# Patient Record
Sex: FEMALE | HISPANIC OR LATINO | Employment: PART TIME | URBAN - METROPOLITAN AREA
[De-identification: names, ages, dates, MRNs, and addresses within clinical notes are randomized per-mention and may not be internally consistent; named-entity substitution may affect disease eponyms.]

---

## 2017-11-06 NOTE — H&P
DATE OF SCHEDULED SURGERY:  2017    REASON FOR SURGERY:  Menometrorrhagia, dysmenorrhea, pelvic pain, symptomatic   pelvic floor relaxation, associated type 2 stress urinary incontinence.    HISTORY OF PRESENT ILLNESS:  This is a 42-year-old woman  2, para 2,   negative urine pregnancy test on 2017, who states that she has exhausted   all conservative measures, now interested in proceeding forward with   attempted definitive surgical correction of her longstanding history of heavy   vaginal bleeding and passage of large clots.  The patient has had a blood   transfusion secondary to her heavy vaginal bleeding.  She also gets migraine   headaches with hormonal therapy.  She has exhausted all conservative measures,   not interested in proceeding forward with any further hormonal therapy, now   interested in proceeding forward with attempted definitive surgical correction   with laparoscopic-assisted vaginal hysterectomy, bilateral salpingectomy   after counseling and interested in proceeding forward with ovarian   conservation.  She is interested in proceeding forward with attempted   definitive surgical correction of her symptomatic pelvic floor relaxation with   a large bulge at the vaginal introitus consistent with the cervix and   anterior vaginal wall being the leading edges.  She also has significant   dyspareunia, which she attributes to her significant pelvic floor relaxation   with associated type 2 stress urinary incontinence, which was demonstrated and   confirmed on urodynamic studies.  She states that she has exhausted all   conservative measures, now interested in proceeding forward with correction of   her symptomatic pelvic floor relaxation with an anterior and posterior   vaginal repair, enterocele repair, sacrospinous wall suspension,   transobturator tape midurethral sling procedure in addition to a   perineoplasty.  She does understand the limitations of surgery in addressing    pelvic pain, dyspareunia, which maybe improved or actually worsened with the   surgery as described above and even understanding these limitations of surgery   and concerns regarding need for further management, she is interested in   proceeding forward with surgery nonetheless.  Of note, the patient has   undergone a workup including endometrial biopsy for her heavy vaginal   bleeding, which showed proliferative phase endometrium.  She also had an   abnormal Pap smear, which did demonstrate low-grade cervical dysplasia and   urodynamic studies, which demonstrate confirmed type 2 stress urinary   incontinence with an ultrasound that is currently pending.    PAST MEDICAL HISTORY:  Severe anemia requiring a blood transfusion, migraine   headache, otherwise as stated above.    PAST SURGICAL HISTORY:  She denies any previous surgeries.    OBSTETRICAL HISTORY:  The patient has had 2 previous deliveries.    GYNECOLOGIC HISTORY:  The patient does report completely irregular cycles,   severe dysmenorrhea now with breakthrough bleeding between cycles, significant   dyspareunia, large bulge at the vaginal introitus, stress urinary   incontinence, occasional overactive bladder symptoms, which may be as stated   above and improved as well or worsen requiring future management.  She denies   any previous sexually transmitted diseases or pelvic infections.    SOCIAL HISTORY:  She is .  She does have a history of depression and is   currently on antidepressant therapy.  She denies use of any alcohol, tobacco,   or recreational drug use.    MEDICATIONS:  Zoloft 100 mg tablet p.o. daily.    ALLERGIES:  No known drug allergies.    PHYSICAL EXAMINATION:  VITAL SIGNS:  She is afebrile, hemodynamically stable.  HEART:  Regular rate and rhythm.  CHEST:  Clear to auscultation bilaterally.  ABDOMEN:  Soft, nondistended, nontender.  Bowel sounds are present.  PELVIC:  Grade II anterior, posterior, and uterine relaxation with  minimal   tenderness to palpation at the uterine fundus.  No adnexal mass or tenderness   to palpation were appreciated on rectovaginal examination.  There is evidence   of an enterocele on rectovaginal examination.  EXTREMITIES:  Nontender.    LABORATORY, IMAGING, AND URODYNAMIC STUDIES:  As stated above.    ASSESSMENT AND PLAN:  A 42-year-old woman  2, para 2 who has exhausted   all conservative measures and is now interested in proceeding forward with   attempted definitive surgical correction.  Risks, benefits, and alternatives   of all individual portions of the procedure were addressed with the patient in   detail over several visits.  She has asked appropriate questions, signed the   appropriate consents, and is wishing to proceed forward with surgery as   planned.       ____________________________________     MD NOEMI SCOTT / DENISE    DD:  2017 08:03:52  DT:  2017 08:45:42    D#:  [id number]  Job#:  [id number]

## 2017-11-21 DIAGNOSIS — Z01.812 PRE-OPERATIVE LABORATORY EXAMINATION: ICD-10-CM

## 2017-11-21 DIAGNOSIS — Z01.810 PRE-OPERATIVE CARDIOVASCULAR EXAMINATION: ICD-10-CM

## 2017-11-21 LAB
ANION GAP SERPL CALC-SCNC: 11 MMOL/L (ref 0–11.9)
ANISOCYTOSIS BLD QL SMEAR: ABNORMAL
BASOPHILS # BLD AUTO: 0 % (ref 0–1.8)
BASOPHILS # BLD: 0 K/UL (ref 0–0.12)
BUN SERPL-MCNC: 14 MG/DL (ref 8–22)
CALCIUM SERPL-MCNC: 9.5 MG/DL (ref 8.5–10.5)
CHLORIDE SERPL-SCNC: 105 MMOL/L (ref 96–112)
CO2 SERPL-SCNC: 25 MMOL/L (ref 20–33)
CREAT SERPL-MCNC: 0.58 MG/DL (ref 0.5–1.4)
EOSINOPHIL # BLD AUTO: 0 K/UL (ref 0–0.51)
EOSINOPHIL NFR BLD: 0 % (ref 0–6.9)
ERYTHROCYTE [DISTWIDTH] IN BLOOD BY AUTOMATED COUNT: 43.6 FL (ref 35.9–50)
GFR SERPL CREATININE-BSD FRML MDRD: >60 ML/MIN/1.73 M 2
GLUCOSE SERPL-MCNC: 83 MG/DL (ref 65–99)
HCT VFR BLD AUTO: 24.6 % (ref 37–47)
HGB BLD-MCNC: 5.9 G/DL (ref 12–16)
HYPOCHROMIA BLD QL SMEAR: ABNORMAL
LG PLATELETS BLD QL SMEAR: NORMAL
LYMPHOCYTES # BLD AUTO: 1.37 K/UL (ref 1–4.8)
LYMPHOCYTES NFR BLD: 39.1 % (ref 22–41)
MANUAL DIFF BLD: NORMAL
MCH RBC QN AUTO: 14 PG (ref 27–33)
MCHC RBC AUTO-ENTMCNC: 24.1 G/DL (ref 33.6–35)
MCV RBC AUTO: 58.1 FL (ref 81.4–97.8)
MICROCYTES BLD QL SMEAR: ABNORMAL
MONOCYTES # BLD AUTO: 0.19 K/UL (ref 0–0.85)
MONOCYTES NFR BLD AUTO: 5.5 % (ref 0–13.4)
MORPHOLOGY BLD-IMP: NORMAL
NEUTROPHILS # BLD AUTO: 1.94 K/UL (ref 2–7.15)
NEUTROPHILS NFR BLD: 55.4 % (ref 44–72)
NRBC # BLD AUTO: 0 K/UL
NRBC BLD AUTO-RTO: 0 /100 WBC
OVALOCYTES BLD QL SMEAR: NORMAL
PLATELET # BLD AUTO: 244 K/UL (ref 164–446)
PLATELET BLD QL SMEAR: NORMAL
POIKILOCYTOSIS BLD QL SMEAR: NORMAL
POTASSIUM SERPL-SCNC: 3.9 MMOL/L (ref 3.6–5.5)
RBC # BLD AUTO: 4.22 M/UL (ref 4.2–5.4)
RBC BLD AUTO: PRESENT
SCHISTOCYTES BLD QL SMEAR: NORMAL
SODIUM SERPL-SCNC: 141 MMOL/L (ref 135–145)
WBC # BLD AUTO: 3.5 K/UL (ref 4.8–10.8)

## 2017-11-21 PROCEDURE — 85027 COMPLETE CBC AUTOMATED: CPT

## 2017-11-21 PROCEDURE — 36415 COLL VENOUS BLD VENIPUNCTURE: CPT

## 2017-11-21 PROCEDURE — 80048 BASIC METABOLIC PNL TOTAL CA: CPT

## 2017-11-21 PROCEDURE — 85007 BL SMEAR W/DIFF WBC COUNT: CPT

## 2017-11-21 RX ORDER — SERTRALINE HYDROCHLORIDE 100 MG/1
100 TABLET, FILM COATED ORAL DAILY
COMMUNITY
End: 2018-09-10

## 2017-11-21 RX ORDER — BUPROPION HYDROCHLORIDE 150 MG/1
150 TABLET, EXTENDED RELEASE ORAL DAILY
COMMUNITY
End: 2018-09-10

## 2017-11-21 NOTE — OR NURSING
Patient reports that she had a dental extraction this morning for an infected tooth.  She has not started her antibiotics at this time.   Reported to [place] at Dr Villarreal's office. She will check with her physician regarding the surgery.

## 2017-11-22 PROCEDURE — 99285 EMERGENCY DEPT VISIT HI MDM: CPT

## 2017-11-22 ASSESSMENT — PAIN SCALES - GENERAL: PAINLEVEL_OUTOF10: 0

## 2017-11-23 ENCOUNTER — HOSPITAL ENCOUNTER (EMERGENCY)
Facility: MEDICAL CENTER | Age: 42
End: 2017-11-23
Attending: EMERGENCY MEDICINE
Payer: MEDICAID

## 2017-11-23 VITALS
DIASTOLIC BLOOD PRESSURE: 66 MMHG | OXYGEN SATURATION: 99 % | BODY MASS INDEX: 25.56 KG/M2 | RESPIRATION RATE: 17 BRPM | HEIGHT: 64 IN | HEART RATE: 63 BPM | TEMPERATURE: 97.1 F | WEIGHT: 149.69 LBS | SYSTOLIC BLOOD PRESSURE: 117 MMHG

## 2017-11-23 DIAGNOSIS — D50.9 IRON DEFICIENCY ANEMIA, UNSPECIFIED IRON DEFICIENCY ANEMIA TYPE: ICD-10-CM

## 2017-11-23 DIAGNOSIS — D63.8 ANEMIA, CHRONIC DISEASE: ICD-10-CM

## 2017-11-23 LAB
ABO GROUP BLD: NORMAL
ANION GAP SERPL CALC-SCNC: 8 MMOL/L (ref 0–11.9)
ANISOCYTOSIS BLD QL SMEAR: ABNORMAL
BARCODED ABORH UBTYP: 5100
BARCODED PRD CODE UBPRD: NORMAL
BARCODED UNIT NUM UBUNT: NORMAL
BASOPHILS # BLD AUTO: 0.9 % (ref 0–1.8)
BASOPHILS # BLD: 0.04 K/UL (ref 0–0.12)
BLD GP AB SCN SERPL QL: NORMAL
BUN SERPL-MCNC: 15 MG/DL (ref 8–22)
CALCIUM SERPL-MCNC: 9.4 MG/DL (ref 8.5–10.5)
CHLORIDE SERPL-SCNC: 106 MMOL/L (ref 96–112)
CO2 SERPL-SCNC: 23 MMOL/L (ref 20–33)
COMPONENT R 8504R: NORMAL
CREAT SERPL-MCNC: 0.52 MG/DL (ref 0.5–1.4)
EOSINOPHIL # BLD AUTO: 0 K/UL (ref 0–0.51)
EOSINOPHIL NFR BLD: 0 % (ref 0–6.9)
ERYTHROCYTE [DISTWIDTH] IN BLOOD BY AUTOMATED COUNT: 43.5 FL (ref 35.9–50)
GFR SERPL CREATININE-BSD FRML MDRD: >60 ML/MIN/1.73 M 2
GIANT PLATELETS BLD QL SMEAR: NORMAL
GLUCOSE SERPL-MCNC: 83 MG/DL (ref 65–99)
HCT VFR BLD AUTO: 25.1 % (ref 37–47)
HGB BLD-MCNC: 6.1 G/DL (ref 12–16)
HYPOCHROMIA BLD QL SMEAR: ABNORMAL
INR PPP: 1.02 (ref 0.87–1.13)
LYMPHOCYTES # BLD AUTO: 1.97 K/UL (ref 1–4.8)
LYMPHOCYTES NFR BLD: 45.9 % (ref 22–41)
MACROCYTES BLD QL SMEAR: ABNORMAL
MANUAL DIFF BLD: NORMAL
MCH RBC QN AUTO: 14.2 PG (ref 27–33)
MCHC RBC AUTO-ENTMCNC: 24.3 G/DL (ref 33.6–35)
MCV RBC AUTO: 58.4 FL (ref 81.4–97.8)
MICROCYTES BLD QL SMEAR: ABNORMAL
MONOCYTES # BLD AUTO: 0.12 K/UL (ref 0–0.85)
MONOCYTES NFR BLD AUTO: 2.7 % (ref 0–13.4)
MORPHOLOGY BLD-IMP: NORMAL
NEUTROPHILS # BLD AUTO: 2.17 K/UL (ref 2–7.15)
NEUTROPHILS NFR BLD: 50.5 % (ref 44–72)
NRBC # BLD AUTO: 0 K/UL
NRBC BLD AUTO-RTO: 0 /100 WBC
OVALOCYTES BLD QL SMEAR: NORMAL
PLATELET # BLD AUTO: 226 K/UL (ref 164–446)
PLATELET BLD QL SMEAR: NORMAL
POIKILOCYTOSIS BLD QL SMEAR: NORMAL
POTASSIUM SERPL-SCNC: 3.9 MMOL/L (ref 3.6–5.5)
PRODUCT TYPE UPROD: NORMAL
PROTHROMBIN TIME: 13.1 SEC (ref 12–14.6)
RBC # BLD AUTO: 4.3 M/UL (ref 4.2–5.4)
RBC BLD AUTO: PRESENT
RH BLD: NORMAL
SODIUM SERPL-SCNC: 137 MMOL/L (ref 135–145)
UNIT STATUS USTAT: NORMAL
WBC # BLD AUTO: 4.3 K/UL (ref 4.8–10.8)

## 2017-11-23 PROCEDURE — 36430 TRANSFUSION BLD/BLD COMPNT: CPT

## 2017-11-23 PROCEDURE — 36415 COLL VENOUS BLD VENIPUNCTURE: CPT

## 2017-11-23 PROCEDURE — 86850 RBC ANTIBODY SCREEN: CPT

## 2017-11-23 PROCEDURE — 86923 COMPATIBILITY TEST ELECTRIC: CPT

## 2017-11-23 PROCEDURE — P9016 RBC LEUKOCYTES REDUCED: HCPCS

## 2017-11-23 PROCEDURE — 80048 BASIC METABOLIC PNL TOTAL CA: CPT

## 2017-11-23 PROCEDURE — 700105 HCHG RX REV CODE 258: Performed by: EMERGENCY MEDICINE

## 2017-11-23 PROCEDURE — 85007 BL SMEAR W/DIFF WBC COUNT: CPT

## 2017-11-23 PROCEDURE — 85027 COMPLETE CBC AUTOMATED: CPT

## 2017-11-23 PROCEDURE — 86901 BLOOD TYPING SEROLOGIC RH(D): CPT

## 2017-11-23 PROCEDURE — 86900 BLOOD TYPING SEROLOGIC ABO: CPT

## 2017-11-23 PROCEDURE — 85610 PROTHROMBIN TIME: CPT

## 2017-11-23 RX ORDER — SODIUM CHLORIDE 9 MG/ML
INJECTION, SOLUTION INTRAVENOUS CONTINUOUS
Status: DISCONTINUED | OUTPATIENT
Start: 2017-11-23 | End: 2017-11-23 | Stop reason: HOSPADM

## 2017-11-23 RX ADMIN — SODIUM CHLORIDE 500 ML: 9 INJECTION, SOLUTION INTRAVENOUS at 05:50

## 2017-11-23 ASSESSMENT — LIFESTYLE VARIABLES
EVER HAD A DRINK FIRST THING IN THE MORNING TO STEADY YOUR NERVES TO GET RID OF A HANGOVER: NO
TOTAL SCORE: 0
EVER FELT BAD OR GUILTY ABOUT YOUR DRINKING: NO
HAVE YOU EVER FELT YOU SHOULD CUT DOWN ON YOUR DRINKING: NO
CONSUMPTION TOTAL: INCOMPLETE
HAVE PEOPLE ANNOYED YOU BY CRITICIZING YOUR DRINKING: NO
TOTAL SCORE: 0
DO YOU DRINK ALCOHOL: YES
TOTAL SCORE: 0

## 2017-11-23 ASSESSMENT — ENCOUNTER SYMPTOMS
DIZZINESS: 1
FEVER: 0
NAUSEA: 0
BRUISES/BLEEDS EASILY: 1
SHORTNESS OF BREATH: 1
VOMITING: 0

## 2017-11-23 NOTE — ED NOTES
"Chief Complaint   Patient presents with   • Abnormal Labs     Pt states PCP called today and pt was told to come to ED for blood transfusion. Per pt, hemoglobin was 5.9 after blood draw yesterday. Pt has had prior transfusions, hx of anemia.      Pt ambulatory to triage with above complaint.     /59   Pulse 80   Temp 37.1 °C (98.8 °F)   Resp 16   Ht 1.626 m (5' 4\")   Wt 67.9 kg (149 lb 11.1 oz)   LMP  (LMP Unknown)   SpO2 100%   BMI 25.69 kg/m²     Pt informed of triage process and encouraged to notify staff of any changed or concerns. Pt placed back in lobby.   "

## 2017-11-23 NOTE — DISCHARGE INSTRUCTIONS
Iron Deficiency Anemia, Adult  Anemia is a condition in which there are less red blood cells or hemoglobin in the blood than normal. Hemoglobin is the part of red blood cells that carries oxygen. Iron deficiency anemia is anemia caused by too little iron. It is the most common type of anemia. It may leave you tired and short of breath.  CAUSES   · Lack of iron in the diet.  · Poor absorption of iron, as seen with intestinal disorders.  · Intestinal bleeding.  · Heavy periods.  SIGNS AND SYMPTOMS   Mild anemia may not be noticeable. Symptoms may include:  · Fatigue.  · Headache.  · Pale skin.  · Weakness.  · Tiredness.  · Shortness of breath.  · Dizziness.  · Cold hands and feet.  · Fast or irregular heartbeat.  DIAGNOSIS   Diagnosis requires a thorough evaluation and physical exam by your health care provider. Blood tests are generally used to confirm iron deficiency anemia. Additional tests may be done to find the underlying cause of your anemia. These may include:  · Testing for blood in the stool (fecal occult blood test).  · A procedure to see inside the colon and rectum (colonoscopy).  · A procedure to see inside the esophagus and stomach (endoscopy).  TREATMENT   Iron deficiency anemia is treated by correcting the cause of the deficiency. Treatment may involve:  · Adding iron-rich foods to your diet.  · Taking iron supplements. Pregnant or breastfeeding women need to take extra iron because their normal diet usually does not provide the required amount.  · Taking vitamins. Vitamin C improves the absorption of iron. Your health care provider may recommend that you take your iron tablets with a glass of orange juice or vitamin C supplement.  · Medicines to make heavy menstrual flow lighter.  · Surgery.  HOME CARE INSTRUCTIONS   · Take iron as directed by your health care provider.  ¨ If you cannot tolerate taking iron supplements by mouth, talk to your health care provider about taking them through a vein  (intravenously) or an injection into a muscle.  ¨ For the best iron absorption, iron supplements should be taken on an empty stomach. If you cannot tolerate them on an empty stomach, you may need to take them with food.  ¨ Do not drink milk or take antacids at the same time as your iron supplements. Milk and antacids may interfere with the absorption of iron.  ¨ Iron supplements can cause constipation. Make sure to include fiber in your diet to prevent constipation. A stool softener may also be recommended.  · Take vitamins as directed by your health care provider.  · Eat a diet rich in iron. Foods high in iron include liver, lean beef, whole-grain bread, eggs, dried fruit, and dark green leafy vegetables.  SEEK IMMEDIATE MEDICAL CARE IF:   · You faint. If this happens, do not drive. Call your local emergency services (911 in [place] if no other help is available.  · You have chest pain.  · You feel nauseous or vomit.  · You have severe or increased shortness of breath with activity.  · You feel weak.  · You have a rapid heartbeat.  · You have unexplained sweating.  · You become light-headed when getting up from a chair or bed.  MAKE SURE YOU:   · Understand these instructions.  · Will watch your condition.  · Will get help right away if you are not doing well or get worse.     This information is not intended to replace advice given to you by your health care provider. Make sure you discuss any questions you have with your health care provider.     Document Released: 12/15/2001 Document Revised: 01/08/2016 Document Reviewed: 08/25/2014  [employer] Patient Education ©2016 [employer]

## 2017-11-23 NOTE — ED NOTES
Transfusion finished. Pt given discharge instructions. Pt verbalized understanding. VSS no acute distress noted, pt able to ambulate without difficulty. Pt home with daughter

## 2017-11-23 NOTE — ED NOTES
Pt brought back to rm GR 27 from triage. Pt c/o dizziness, SOB, and feeling tired. Pt able to transfer self to bed, pt in gown, on monitor, family at bedside, call light in reach. PIV line established. Blood drawn and sent to the lab. Chart up for ERP.

## 2017-11-23 NOTE — ED PROVIDER NOTES
ED Provider Note    ED Provider Note    Scribed for Anila Nolan D.O. by Anila Nolan. 11/23/2017, 2:53 AM.    Primary care provider: Winston Castle D.N.P.  Means of arrival: POV  History obtained from: Patient  History limited by: None    CHIEF COMPLAINT  Chief Complaint   Patient presents with   • Abnormal Labs     Pt states PCP called today and pt was told to come to ED for blood transfusion. Per pt, hemoglobin was 5.9 after blood draw yesterday. Pt has had prior transfusions, hx of anemia.        ATIYA Catalan is a 42 y.o. female who presents to the Emergency DepartmentOn recommendation from her primary care doctor for a blood transfusion. Patient had labs drawn on November 21. She she was found to have a hemoglobin of 5.9. Patient has a long history of anemia. She was last transfused in 2014. She is supposed to be on iron although she does not take it as instructed. Her anemia is thought to be due to an absorption issue. Patient had gastric bypass. She does complain of occasional lightheadedness, shortness of breath and chest tightness when she feels very hemoglobin lowering. Patient is feeling fine at this time but she has had these symptoms recently. She's recently been on amoxicillin for a dental procedure. She denies any fever, GI symptoms or urinary symptoms.    REVIEW OF SYSTEMS  Review of Systems   Constitutional: Negative for fever.   Respiratory: Positive for shortness of breath.    Cardiovascular: Negative for chest pain.   Gastrointestinal: Negative for nausea and vomiting.   Genitourinary: Negative for dysuria.   Neurological: Positive for dizziness.   Endo/Heme/Allergies: Bruises/bleeds easily.   All other systems reviewed and are negative.      PAST MEDICAL HISTORY   has a past medical history of Anemia (11/21/2017) and Dental disorder (11/20/2017).    SURGICAL HISTORY   has a past surgical history that includes gastric bypass laparoscopic.    SOCIAL HISTORY  Social History   Substance  "Use Topics   • Smoking status: Never Smoker   • Smokeless tobacco: Never Used   • Alcohol use No      History   Drug Use No       FAMILY HISTORY  History reviewed. No pertinent family history.    CURRENT MEDICATIONS  Home Medications    **Home medications have not yet been reviewed for this encounter**         ALLERGIES  No Known Allergies    PHYSICAL EXAM  VITAL SIGNS: /59   Pulse 63   Temp 37.1 °C (98.8 °F)   Resp 18   Ht 1.626 m (5' 4\")   Wt 67.9 kg (149 lb 11.1 oz)   LMP  (LMP Unknown)   SpO2 100%   BMI 25.69 kg/m²   Vitals reviewed.  Constitutional: Patient is oriented to person, place, and time. Appears well-developed and well-nourished. No distress.    Head: Normocephalic and atraumatic.   Ears: Normal external ears bilaterally.   Mouth/Throat: Oropharynx is clear and moist  Eyes: Conjunctivae are normal. Pupils are equal, round, and reactive to light.   Neck: Normal range of motion. Neck supple.  Cardiovascular: Normal rate, regular rhythm and normal heart sounds. Normal peripheral pulses.  Pulmonary/Chest: Effort normal and breath sounds normal. No respiratory distress, no wheezes, rhonchi, or rales.   Abdominal: Soft. Bowel sounds are normal. There is no tenderness, rebound or guarding, or peritoneal signs  Musculoskeletal: No edema  Neurological: No focal deficits.   Skin: Skin is warm and dry. No erythema. No pallor. bruising noted to the left forearm  Psychiatric: Patient has a normal mood and affect.     LABS  Results for orders placed or performed during the hospital encounter of 11/23/17   CBC WITH DIFFERENTIAL   Result Value Ref Range    WBC 4.3 (L) 4.8 - 10.8 K/uL    RBC 4.30 4.20 - 5.40 M/uL    Hemoglobin 6.1 (L) 12.0 - 16.0 g/dL    Hematocrit 25.1 (L) 37.0 - 47.0 %    MCV 58.4 (L) 81.4 - 97.8 fL    MCH 14.2 (L) 27.0 - 33.0 pg    MCHC 24.3 (L) 33.6 - 35.0 g/dL    RDW 43.5 35.9 - 50.0 fL    Nucleated RBC 0.00 /100 WBC    NRBC (Absolute) 0.00 K/uL    Neutrophils-Polys 50.50 44.00 - " 72.00 %    Lymphocytes 45.90 (H) 22.00 - 41.00 %    Monocytes 2.70 0.00 - 13.40 %    Eosinophils 0.00 0.00 - 6.90 %    Basophils 0.90 0.00 - 1.80 %    Neutrophils (Absolute) 2.17 2.00 - 7.15 K/uL    Lymphs (Absolute) 1.97 1.00 - 4.80 K/uL    Monos (Absolute) 0.12 0.00 - 0.85 K/uL    Eos (Absolute) 0.00 0.00 - 0.51 K/uL    Baso (Absolute) 0.04 0.00 - 0.12 K/uL    Hypochromia 1+     Anisocytosis 2+     Macrocytosis 1+     Microcytosis 2+     Platelet Count 226 164 - 446 K/uL   BASIC METABOLIC PANEL   Result Value Ref Range    Sodium 137 135 - 145 mmol/L    Potassium 3.9 3.6 - 5.5 mmol/L    Chloride 106 96 - 112 mmol/L    Co2 23 20 - 33 mmol/L    Glucose 83 65 - 99 mg/dL    Bun 15 8 - 22 mg/dL    Creatinine 0.52 0.50 - 1.40 mg/dL    Calcium 9.4 8.5 - 10.5 mg/dL    Anion Gap 8.0 0.0 - 11.9   PROTHROMBIN TIME   Result Value Ref Range    PT 13.1 12.0 - 14.6 sec    INR 1.02 0.87 - 1.13   COD (ADULT)   Result Value Ref Range    ABO Grouping Only O     Rh Grouping Only POS     Antibody Screen-Cod NEG     Component R       R3                  Red Blood Cells3    [id number]   issued       11/23/17   05:28      Product Type Red Blood Cells LR Pheresis     Dispense Status Issued     Unit Number (Barcoded) [id number]     Product Code (Barcoded) T6528Y98     Blood Type (Barcoded) 5100    ESTIMATED GFR   Result Value Ref Range    GFR If African American >60 >60 mL/min/1.73 m 2    GFR If Non African American >60 >60 mL/min/1.73 m 2   DIFFERENTIAL MANUAL   Result Value Ref Range    Manual Diff Status PERFORMED    PERIPHERAL SMEAR REVIEW   Result Value Ref Range    Peripheral Smear Review see below    PLATELET ESTIMATE   Result Value Ref Range    Plt Estimation Normal    MORPHOLOGY   Result Value Ref Range    RBC Morphology Present     Giant Platelets 2+     Poikilocytosis 2+     Ovalocytes 2+        All labs reviewed by me.    COURSE & MEDICAL DECISION MAKING  Pertinent Labs & Imaging studies reviewed. (See chart for  details)    Obtained and reviewed past medical records. Patient's last encounter was from an admission in 2014. She was diagnosed with iron deficiency microcytic hypochromic anemia and possible pernicious anemia. She was transfused blood at this time.    2:53 AM - Patient seen and examined at bedside. Patient's well-appearing and nontoxic. She has normal vital signs. She is not in any distress. We discussed lab results and the fact that we'll repeat PTs before transfusing. This appears to be an ongoing, chronic problem. Partly exacerbated by the fact that the patient does not routinely take iron as directed.     5:48 AM, patient's reevaluated. No new complaints. At this time, we discussed lab results. Hemoglobin 6.1. I've ordered 1 unit of blood. Patient essentially asymptomatic at this time with normal vital signs. This is a chronic problem. I feel she is safe for transfusion and then discharged to follow up with her primary care doctor and this is her preference as well. She is well-appearing and nontoxic. I anticipate discharge to home after she is transfused.    Patient's in stable condition.      FINAL IMPRESSION  1. Anemia, chronic disease    2. Iron deficiency anemia, unspecified iron deficiency anemia type

## 2017-11-28 ENCOUNTER — HOSPITAL ENCOUNTER (OUTPATIENT)
Facility: MEDICAL CENTER | Age: 42
End: 2017-11-28
Attending: SPECIALIST | Admitting: SPECIALIST
Payer: MEDICAID

## 2017-11-28 VITALS
HEART RATE: 68 BPM | HEIGHT: 64 IN | OXYGEN SATURATION: 100 % | TEMPERATURE: 98.4 F | DIASTOLIC BLOOD PRESSURE: 71 MMHG | RESPIRATION RATE: 16 BRPM | BODY MASS INDEX: 25.37 KG/M2 | WEIGHT: 148.59 LBS | SYSTOLIC BLOOD PRESSURE: 136 MMHG

## 2017-11-28 LAB — HCG SERPL QL: NEGATIVE

## 2017-11-28 PROCEDURE — 160035 HCHG PACU - 1ST 60 MINS PHASE I: Performed by: SPECIALIST

## 2017-11-28 PROCEDURE — 501838 HCHG SUTURE GENERAL: Performed by: SPECIALIST

## 2017-11-28 PROCEDURE — 501330 HCHG SET, CYSTO IRRIG TUBING: Performed by: SPECIALIST

## 2017-11-28 PROCEDURE — 84703 CHORIONIC GONADOTROPIN ASSAY: CPT

## 2017-11-28 PROCEDURE — 700111 HCHG RX REV CODE 636 W/ 250 OVERRIDE (IP)

## 2017-11-28 PROCEDURE — 501579 HCHG TROCAR, STEP 5MM: Performed by: SPECIALIST

## 2017-11-28 PROCEDURE — 500854 HCHG NEEDLE, INSUFFLATION FOR STEP: Performed by: SPECIALIST

## 2017-11-28 PROCEDURE — A4338 INDWELLING CATHETER LATEX: HCPCS | Performed by: SPECIALIST

## 2017-11-28 PROCEDURE — 700102 HCHG RX REV CODE 250 W/ 637 OVERRIDE(OP)

## 2017-11-28 PROCEDURE — 160029 HCHG SURGERY MINUTES - 1ST 30 MINS LEVEL 4: Performed by: SPECIALIST

## 2017-11-28 PROCEDURE — 160048 HCHG OR STATISTICAL LEVEL 1-5: Performed by: SPECIALIST

## 2017-11-28 PROCEDURE — 88307 TISSUE EXAM BY PATHOLOGIST: CPT

## 2017-11-28 PROCEDURE — 160009 HCHG ANES TIME/MIN: Performed by: SPECIALIST

## 2017-11-28 PROCEDURE — 500892 HCHG PACK, PERI-GYN: Performed by: SPECIALIST

## 2017-11-28 PROCEDURE — C1771 REP DEV, URINARY, W/SLING: HCPCS | Performed by: SPECIALIST

## 2017-11-28 PROCEDURE — 501577 HCHG TROCAR, STEP 11MM: Performed by: SPECIALIST

## 2017-11-28 PROCEDURE — 502240 HCHG MISC OR SUPPLY RC 0272: Performed by: SPECIALIST

## 2017-11-28 PROCEDURE — 700101 HCHG RX REV CODE 250

## 2017-11-28 PROCEDURE — 160036 HCHG PACU - EA ADDL 30 MINS PHASE I: Performed by: SPECIALIST

## 2017-11-28 PROCEDURE — 160002 HCHG RECOVERY MINUTES (STAT): Performed by: SPECIALIST

## 2017-11-28 PROCEDURE — 500886 HCHG PACK, LAPAROSCOPY: Performed by: SPECIALIST

## 2017-11-28 PROCEDURE — 502703 HCHG DEVICE, LIGASURE V SEALER: Performed by: SPECIALIST

## 2017-11-28 PROCEDURE — 160041 HCHG SURGERY MINUTES - EA ADDL 1 MIN LEVEL 4: Performed by: SPECIALIST

## 2017-11-28 PROCEDURE — A9270 NON-COVERED ITEM OR SERVICE: HCPCS

## 2017-11-28 DEVICE — SLING TOT OBTRYX I HALO: Type: IMPLANTABLE DEVICE | Status: FUNCTIONAL

## 2017-11-28 RX ORDER — SODIUM CHLORIDE, SODIUM LACTATE, POTASSIUM CHLORIDE, CALCIUM CHLORIDE 600; 310; 30; 20 MG/100ML; MG/100ML; MG/100ML; MG/100ML
INJECTION, SOLUTION INTRAVENOUS CONTINUOUS
Status: DISCONTINUED | OUTPATIENT
Start: 2017-11-28 | End: 2017-11-28 | Stop reason: HOSPADM

## 2017-11-28 RX ORDER — ACETAMINOPHEN 500 MG
1000 TABLET ORAL EVERY 6 HOURS
Status: DISCONTINUED | OUTPATIENT
Start: 2017-11-28 | End: 2017-11-28 | Stop reason: HOSPADM

## 2017-11-28 RX ORDER — METOCLOPRAMIDE HYDROCHLORIDE 5 MG/ML
10 INJECTION INTRAMUSCULAR; INTRAVENOUS EVERY 4 HOURS PRN
Status: DISCONTINUED | OUTPATIENT
Start: 2017-11-28 | End: 2017-11-28 | Stop reason: HOSPADM

## 2017-11-28 RX ORDER — EPINEPHRINE 1 MG/ML
INJECTION INTRAMUSCULAR; INTRAVENOUS; SUBCUTANEOUS
Status: DISCONTINUED
Start: 2017-11-28 | End: 2017-11-28 | Stop reason: HOSPADM

## 2017-11-28 RX ORDER — BUPIVACAINE HYDROCHLORIDE AND EPINEPHRINE 2.5; 5 MG/ML; UG/ML
INJECTION, SOLUTION EPIDURAL; INFILTRATION; INTRACAUDAL; PERINEURAL
Status: DISCONTINUED | OUTPATIENT
Start: 2017-11-28 | End: 2017-11-28 | Stop reason: HOSPADM

## 2017-11-28 RX ORDER — OXYCODONE HCL 5 MG/5 ML
SOLUTION, ORAL ORAL
Status: COMPLETED
Start: 2017-11-28 | End: 2017-11-28

## 2017-11-28 RX ORDER — OXYCODONE HYDROCHLORIDE 5 MG/1
10 TABLET ORAL
Status: DISCONTINUED | OUTPATIENT
Start: 2017-11-28 | End: 2017-11-28 | Stop reason: HOSPADM

## 2017-11-28 RX ORDER — BUPIVACAINE HYDROCHLORIDE AND EPINEPHRINE 2.5; 5 MG/ML; UG/ML
INJECTION, SOLUTION INFILTRATION; PERINEURAL
Status: DISCONTINUED | OUTPATIENT
Start: 2017-11-28 | End: 2017-11-28 | Stop reason: HOSPADM

## 2017-11-28 RX ORDER — ONDANSETRON 2 MG/ML
4 INJECTION INTRAMUSCULAR; INTRAVENOUS EVERY 6 HOURS PRN
Status: DISCONTINUED | OUTPATIENT
Start: 2017-11-28 | End: 2017-11-28 | Stop reason: HOSPADM

## 2017-11-28 RX ORDER — BUPIVACAINE HYDROCHLORIDE 2.5 MG/ML
INJECTION, SOLUTION EPIDURAL; INFILTRATION; INTRACAUDAL
Status: DISCONTINUED
Start: 2017-11-28 | End: 2017-11-28 | Stop reason: HOSPADM

## 2017-11-28 RX ORDER — SIMETHICONE 80 MG
80 TABLET,CHEWABLE ORAL EVERY 8 HOURS PRN
Status: DISCONTINUED | OUTPATIENT
Start: 2017-11-28 | End: 2017-11-28 | Stop reason: HOSPADM

## 2017-11-28 RX ORDER — MEPERIDINE HYDROCHLORIDE 25 MG/ML
INJECTION INTRAMUSCULAR; INTRAVENOUS; SUBCUTANEOUS
Status: COMPLETED
Start: 2017-11-28 | End: 2017-11-28

## 2017-11-28 RX ADMIN — OXYCODONE HYDROCHLORIDE 10 MG: 5 SOLUTION ORAL at 10:05

## 2017-11-28 RX ADMIN — SODIUM CHLORIDE, SODIUM LACTATE, POTASSIUM CHLORIDE, CALCIUM CHLORIDE 1000 ML: 600; 310; 30; 20 INJECTION, SOLUTION INTRAVENOUS at 06:10

## 2017-11-28 RX ADMIN — MEPERIDINE HYDROCHLORIDE 12.5 MG: 25 INJECTION INTRAMUSCULAR; INTRAVENOUS; SUBCUTANEOUS at 09:34

## 2017-11-28 RX ADMIN — FENTANYL CITRATE 50 MCG: 50 INJECTION, SOLUTION INTRAMUSCULAR; INTRAVENOUS at 10:05

## 2017-11-28 RX ADMIN — FENTANYL CITRATE 25 MCG: 50 INJECTION, SOLUTION INTRAMUSCULAR; INTRAVENOUS at 12:15

## 2017-11-28 RX ADMIN — MEPERIDINE HYDROCHLORIDE 12.5 MG: 25 INJECTION INTRAMUSCULAR; INTRAVENOUS; SUBCUTANEOUS at 09:28

## 2017-11-28 RX ADMIN — FENTANYL CITRATE 25 MCG: 50 INJECTION, SOLUTION INTRAMUSCULAR; INTRAVENOUS at 12:20

## 2017-11-28 ASSESSMENT — PAIN SCALES - GENERAL
PAINLEVEL_OUTOF10: 4
PAINLEVEL_OUTOF10: 0
PAINLEVEL_OUTOF10: 5
PAINLEVEL_OUTOF10: 0
PAINLEVEL_OUTOF10: 0
PAINLEVEL_OUTOF10: 3
PAINLEVEL_OUTOF10: 0

## 2017-11-28 NOTE — DISCHARGE INSTRUCTIONS
ACTIVITY: Rest and take it easy for the first 24 hours.  A responsible adult is recommended to remain with you during that time.  It is normal to feel sleepy.  We encourage you to not do anything that requires balance, judgment or coordination.    MILD FLU-LIKE SYMPTOMS ARE NORMAL. YOU MAY EXPERIENCE GENERALIZED MUSCLE ACHES, THROAT IRRITATION, HEADACHE AND/OR SOME NAUSEA.    FOR 24 HOURS DO NOT:  Drive, operate machinery or run household appliances.  Drink beer or alcoholic beverages.   Make important decisions or sign legal documents.    SPECIAL INSTRUCTIONS: *Refer to hysterectomy instructions**    DIET: To avoid nausea, slowly advance diet as tolerated, avoiding spicy or greasy foods for the first day.  Add more substantial food to your diet according to your physician's instructions.  Babies can be fed formula or breast milk as soon as they are hungry.  INCREASE FLUIDS AND FIBER TO AVOID CONSTIPATION.    SURGICAL DRESSING/BATHING: *May remove dressings then may shower in 24 hours**    FOLLOW-UP APPOINTMENT:  A follow-up appointment should be arranged with your doctor in *call to schedule**.    You should CALL YOUR PHYSICIAN if you develop:  Fever greater than 101 degrees F.  Pain not relieved by medication, or persistent nausea or vomiting.  Excessive bleeding (blood soaking through dressing) or unexpected drainage from the wound.  Extreme redness or swelling around the incision site, drainage of pus or foul smelling drainage.  Inability to urinate or empty your bladder within 8 hours.  Problems with breathing or chest pain.    You should call 911 if you develop problems with breathing or chest pain.  If you are unable to contact your doctor or surgical center, you should go to the nearest emergency room or urgent care center.  Physician's telephone #: *Dr. Villarreal [phone]**    If any questions arise, call your doctor.  If your doctor is not available, please feel free to call the Surgical Center at  [phone].  The Center is open Monday through Friday from 7AM to 7PM.  You can also call the HEALTH HOTLINE open 24 hours/day, 7 days/week and speak to a nurse at [phone], or toll free at [phone].    A registered nurse may call you a few days after your surgery to see how you are doing after your procedure.    MEDICATIONS: Resume taking daily medication.  Take prescribed pain medication with food.  If no medication is prescribed, you may take non-aspirin pain medication if needed.  PAIN MEDICATION CAN BE VERY CONSTIPATING.  Take a stool softener or laxative such as senokot, pericolace, or milk of magnesia if needed.    Prescription given  At pre-op appt..  Last pain medication given at *10:00 am; next dose due at 2:00 pm**.    If your physician has prescribed pain medication that includes Acetaminophen (Tylenol), do not take additional Acetaminophen (Tylenol) while taking the prescribed medication.    Depression / Suicide Risk    As you are discharged from this [facility] facility, it is important to learn how to keep safe from harming yourself.    Recognize the warning signs:  · Abrupt changes in personality, positive or negative- including increase in energy   · Giving away possessions  · Change in eating patterns- significant weight changes-  positive or negative  · Change in sleeping patterns- unable to sleep or sleeping all the time   · Unwillingness or inability to communicate  · Depression  · Unusual sadness, discouragement and loneliness  · Talk of wanting to die  · Neglect of personal appearance   · Rebelliousness- reckless behavior  · Withdrawal from people/activities they love  · Confusion- inability to concentrate     If you or a loved one observes any of these behaviors or has concerns about self-harm, here's what you can do:  · Talk about it- your feelings and reasons for harming yourself  · Remove any means that you might use to hurt yourself (examples: pills, rope, extension  cords, firearm)  · Get professional help from the community (Mental Health, Substance Abuse, psychological counseling)  · Do not be alone:Call your Safe Contact- someone whom you trust who will be there for you.  · Call your local CRISIS HOTLINE 296-1419 or 547-250-7822  · Call your local Children's Mobile Crisis Response Team [place] (268) 259-2293 or www.[employer]  · Call the toll free National Suicide Prevention Hotlines   · National Suicide Prevention Lifeline 086-889-JJTM (5010)  · National Hope Line Network 800-SUICIDE (716-3027)

## 2017-11-28 NOTE — OR NURSING
0953 Received report from Marianela CRAMER, assumed care of pt at this time. Pt sleeping, VS WNL. Pt has large bandaid to abdomen and smaller two on sides of abdomen, all CDI.     1005 Pt medicated with fent and oxy for pain 7/10 to abdomen.     1015 Pt.'s family updated on plan of care, pt now rates pain 0/10.     1130 Pt meets criteria for discharge, assisted with dressing. Pt.'s ride called and on the way.     1215 Pt medicated with fent for pain 6/10 to abdomen.     1220 Pt medicated with fent for pain 6/10 to abdomen.     1310 Pt.'s Daughter arrived, pt and and Daughter given instructions for discharge, evidence of understanding demonstrated.     1320 Pt out to car in .

## 2017-11-28 NOTE — OP REPORT
DATE OF SERVICE:  11/28/2017     PREOPERATIVE DIAGNOSES:  Menometrorrhagia, dysmenorrhea, pelvic pain, symptomatic   pelvic floor relaxation, associated type 2 stress urinary incontinence.     POSTOPERATIVE DIAGNOSES:  Same     FINAL PATHOLOGY:  Pending.     PROCEDURES PERFORMED:  Procedure(s):  VAGINAL HYSTERECTOMY SCOPE TOTAL - Wound Class: Clean Contaminated  SALPINGECTOMY - Wound Class: Clean Contaminated  ANTERIOR AND POSTERIOR REPAIR - Wound Class: Clean Contaminated  ENTEROCELE REPAIR, PERINEOPLASTY   - Wound Class: Clean Contaminated  BLADDER SLING FEMALE - TOT - Wound Class: Clean Contaminated  VAGINAL SUSPENSION- POSS SACROSPINOUS VAULT SUSPENSION   - Wound Class: Clean Contaminated     SURGEON:  Jose C Villarreal MD.     ASSISTANT:  Daniel Rivera MD.     ANESTHESIA:  General     ANESTHESIOLOGIST:  Anesthesiologist: Derek Wyman M.D.     ESTIMATED BLOOD LOSS FOR THE PROCEDURE:  100 mL.     FINDINGS:  Bulbous uterus with normal appearing adnexa and pelvis.    Good support of the anterior and posterior vaginal    walls in addition to apical vaginal tissue without any undue tension in the    suture sites.  Cystoscopy revealed normal urinary bladder, bilateral ureteral    efflux, and no undue tension noted at the level of the mid urethra after a    sling placement on cystoscopic evaluation.     DESCRIPTION OF PROCEDURE:  The patient was taken to the operating room,   where  general anesthesia was performed without difficulty.  The patient was then    prepped and draped in usual sterile fashion with lower extremities placed in    Huy stirrups.  Attention was first turned to the perineum, where a weighted    speculum was placed with the use of Ruff retractor.  Single-toothed tenaculum    was placed on the anterior lip of the cervix.  Hulka uterine manipulator was    then placed.  Single-toothed tenaculum and retractors were then removed.     Attention was then turned to the umbilicus, where a 1 cm incision was  made.     Veress needle was placed, 3.5 L of carboperitoneum were then obtained.  A 10    mm trocar port was then placed.  Two separate ports were placed approximately    one-third of the way from the anterior-superior iliac spine lateral to the    rectus muscle under direct laparoscopic visualization with 5 mm ports placed.     Attention was then turned to the perineum, where a weighted speculum was    placed with the use of Ruff retractors.  Single-toothed tenaculum was placed    on the anterior lip of the cervix.  Hulka uterine manipulator was then placed.    Single-toothed tenaculum and retractor was then removed.  Attention was then   turned to the umbilicus, where a 1 cm incision was made.  A Veress needle was   placed, 3.5 L of carboperitoneum were then obtained.  A 10 mm trocar port was   then placed.  Two separate ports were placed approximately one-third of the    way from the anterior-superior iliac spine lateral to the rectus muscle under    direct laparoscopic visualization.  Attention was then turned to the distal    portion of the fallopian tubes, which were grasped just below the fallopian    tube above the ovary.  This area was grasped, cauterized, and transected on    each side.  The mesosalpinx underneath the fallopian tube was then grasped,    cauterized, and transected all the way to the level of the uterus, freeing up    the fallopian tube on each side.  The uteroovarian, broad, and round ligaments   were individually isolated, cauterized, and transected.  The vesicouterine    peritoneum was grasped, incised, and the bladder flap was created.  Uterine    vessels were then clamped, cauterized, and transected  on each side.  The    vesicouterine peritoneum was then completed.  Attention was then turned to the   perineum, where a weighted speculum was placed with the use of Ruff    retractor.  A thyroid tenaculum was placed on the anterior lip, one on the    posterior lips of the cervix.  Cervix  was then injected with 10 mL of 0.25%    Marcaine with epinephrine.  Incision was made starting anteriorly, proceeding    posterior, proceeding back anterior once again.  With the use of Bovie    electrocautery, after injecting 10 mL of 0.25% Marcaine with epinephrine, the    anterior cul-de-sac was entered sharply.  Right angle Ernesto retractor was    placed upon sharp entry in the anterior cul-de-sac.  Large duck billed    speculum was placed upon sharp entry in the posterior cul-de-sac.  Uterosacral   cardinal complexes were then grasped with ZED clamps, transected, and suture    ligated with 0 Vicryl suture bilaterally.  Attention was then turned to the    perineum, where the uterus and both fallopian tubes were then handed off the    field as specimen.  Excellent hemostasis noted at all vascular pedicles.  The    anterior and posterior leaf of the peritoneum in addition to the uterosacral    cardinal complexes were reapproximated in the midline with a pursestring    suture using 2-0 Vicryl.  The vaginal cuff was then reapproximated with    figure-of-eight sutures using 0 Vicryl reapproximating both uterosacral    cardinal complex and respective vaginal apices.  The anterior vaginal mucosa    was then injected with 10 mL of 0.25% Marcaine with epinephrine.  The vaginal    mucosa was then dissected off the underlying pubocervical fascia melvin until    the levator muscles were then reached.  Horizontal mattress sutures were then    used to reapproximate the pubocervical fascia in midline in a double 0    closure, thereby reducing the midline cystocele.  A 10 mL of 0.25% Marcaine    with epinephrine were injected with 5 mL on the right and 5 mL the left    lateral to the clitoris in the labial crural fold followed by stab incision    made with the use of #11 blade scalpel followed by placement of Obtryx halo    needles through the labial crural incision sites through the obturator    membrane out through the vaginal  mucosal incision at the level of the mid    urethra.  The sling was then applied to the respective Obtryx halo needles and   it was taken back out through their original track.  Tensioning and position    was then performed.  Patterson catheter was removed, which had been placed at the    beginning of the case for placement of a 30-degree cystoscope, which revealed    normal urinary bladder,  bilateral ureteral efflux, and no undue tension noted   at the level of the mid urethra.  The sling had been released under direct    cystoscopic evaluation.  Tensioning position was then finalized.  Cystoscope    removed.  Patterson catheter replaced.  All excess vaginal mucosa and sling    material were then excised.  The vaginal mucosa was then reapproximated with    2-0 Vicryl in a running interlocking fashion in one segment.  Posterior    vaginal mucosa was then injected with 10 mL of 0.25% Marcaine with    epinephrine.  The vaginal mucosa was then dissected off the underlying    rectovaginal fascia melvin until the levator muscles were then reached.     Dissection of the sacrospinous process and ischial spine was then performed on   the right, 3 cm medial along the sacrospinous ligament with straight    catheterization needle device, 0 Ethibond suture was taken through the    sacrospinous ligament and taken out through the right apical portion of the    vaginal cuff on the underlying vaginal mucosa.  The rectovaginal septum was    then reapproximated in the posterior aspect of the vaginal cuff and a double    pursestring suture, thereby reducing a large enterocele located at the    superior aspect of the dissection.  Horizontal mattress suture was then used    to reapproximate the rectovaginal fascia in the midline in a double 0 closure,   thereby reducing the midline rectocele.  Excess vaginal mucosa was then    trimmed.  The vaginal mucosa was then reapproximated with 2-0 Vicryl in a    running interlocking fashion in one segment  for a perineoplasty on the    perineum.  Attention was then turned back to the abdomen and pelvis.  Pelvis    was inspected and noted to be hemostatic, 3.5 L of carboperitoneum removed    followed by removal of the ports under direct laparoscopic visualization.  The   incisions were then reapproximated with single interrupted sutures using 4-0    Vicryl, injected with 20 mL of 0.25% Marcaine with epinephrine.  The patient    tolerated the procedure well and was awoken from general anesthesia, was taken   to the recovery room in stable condition.        ____________________________________     BELINDA JADE MD

## 2017-11-28 NOTE — OR NURSING
0922 Pt received in PACU , accompanied by RN and Anesthesia - report received and care assumed. Monitors on - VSS pt arousable to name - denies pain - shivering - tad hugger on and medicated . Patterson to gravity, draining clear yellow fluid. x3 bandages on abd _ ANNA MARIE- large bandaid over umbilicus, two small bandaids both right and left side - ANNA MARIE, peripad on. Abd. Soft and sl round  0934 medicated for shivering with good results  0915 report to Romana CRAMER

## 2017-11-28 NOTE — OR SURGEON
Immediate Post OP Note    PreOp Diagnosis: Menometrorrhagia, dysmenorrhea, pelvic pain, symptomatic   pelvic floor relaxation, associated type 2 stress urinary incontinence.    PostOp Diagnosis: Same; final pathology pending    Procedure(s):  VAGINAL HYSTERECTOMY SCOPE TOTAL - Wound Class: Clean Contaminated  SALPINGECTOMY - Wound Class: Clean Contaminated  ANTERIOR AND POSTERIOR REPAIR - Wound Class: Clean Contaminated  ENTEROCELE REPAIR, PERINEOPLASTY   - Wound Class: Clean Contaminated  BLADDER SLING FEMALE - TOT - Wound Class: Clean Contaminated  VAGINAL SUSPENSION- POSS SACROSPINOUS VAULT SUSPENSION   - Wound Class: Clean Contaminated    Surgeon(s):  FABIANA Sandoval M.D.    Anesthesiologist/Type of Anesthesia:  Anesthesiologist: Derek Wyman M.D./General    Surgical Staff:  Circulator: Grazyna Hsieh R.N.; Milady Way R.N.  Relief Scrub: Sweetie Bae  Scrub Person: Adri Garcia; Terri Hua    Specimens:  Uterus bilateral fallopian tubes     Estimated Blood Loss: 100ccs    Findings: Bulbous uterus with normal appearing adnexa and pelvis with good support of the anterior and the posterior and the apical vaginal tissue without any undue tension at any suture sites, cystoscopy revealed bilateral ureteral efflux, normal bladder and no undue tension at the mid urethra after sling placement    Complications: None        11/28/2017 9:16 AM Jose C Villarreal

## 2018-04-12 ENCOUNTER — APPOINTMENT (OUTPATIENT)
Dept: RADIOLOGY | Facility: MEDICAL CENTER | Age: 43
End: 2018-04-12
Attending: EMERGENCY MEDICINE
Payer: MEDICAID

## 2018-04-12 ENCOUNTER — HOSPITAL ENCOUNTER (EMERGENCY)
Facility: MEDICAL CENTER | Age: 43
End: 2018-04-13
Attending: EMERGENCY MEDICINE
Payer: MEDICAID

## 2018-04-12 VITALS
HEART RATE: 62 BPM | HEIGHT: 64 IN | OXYGEN SATURATION: 95 % | SYSTOLIC BLOOD PRESSURE: 108 MMHG | DIASTOLIC BLOOD PRESSURE: 56 MMHG | WEIGHT: 154.76 LBS | BODY MASS INDEX: 26.42 KG/M2 | TEMPERATURE: 98.5 F | RESPIRATION RATE: 18 BRPM

## 2018-04-12 DIAGNOSIS — D50.9 IRON DEFICIENCY ANEMIA, UNSPECIFIED IRON DEFICIENCY ANEMIA TYPE: ICD-10-CM

## 2018-04-12 LAB
ABO GROUP BLD: NORMAL
ALBUMIN SERPL BCP-MCNC: 4.4 G/DL (ref 3.2–4.9)
ALBUMIN/GLOB SERPL: 1.5 G/DL
ALP SERPL-CCNC: 159 U/L (ref 30–99)
ALT SERPL-CCNC: 6 U/L (ref 2–50)
ANION GAP SERPL CALC-SCNC: 7 MMOL/L (ref 0–11.9)
ANISOCYTOSIS BLD QL SMEAR: ABNORMAL
APTT PPP: 33.3 SEC (ref 24.7–36)
AST SERPL-CCNC: 23 U/L (ref 12–45)
BARCODED ABORH UBTYP: 5100
BARCODED PRD CODE UBPRD: NORMAL
BARCODED UNIT NUM UBUNT: NORMAL
BASOPHILS # BLD AUTO: 0.9 % (ref 0–1.8)
BASOPHILS # BLD: 0.04 K/UL (ref 0–0.12)
BILIRUB SERPL-MCNC: 0.4 MG/DL (ref 0.1–1.5)
BLD GP AB SCN SERPL QL: NORMAL
BNP SERPL-MCNC: 46 PG/ML (ref 0–100)
BUN SERPL-MCNC: 11 MG/DL (ref 8–22)
CALCIUM SERPL-MCNC: 9.1 MG/DL (ref 8.5–10.5)
CHLORIDE SERPL-SCNC: 108 MMOL/L (ref 96–112)
CO2 SERPL-SCNC: 25 MMOL/L (ref 20–33)
COMPONENT R 8504R: NORMAL
CREAT SERPL-MCNC: 0.46 MG/DL (ref 0.5–1.4)
EKG IMPRESSION: NORMAL
EOSINOPHIL # BLD AUTO: 0.04 K/UL (ref 0–0.51)
EOSINOPHIL NFR BLD: 0.9 % (ref 0–6.9)
ERYTHROCYTE [DISTWIDTH] IN BLOOD BY AUTOMATED COUNT: 46.5 FL (ref 35.9–50)
GIANT PLATELETS BLD QL SMEAR: NORMAL
GLOBULIN SER CALC-MCNC: 2.9 G/DL (ref 1.9–3.5)
GLUCOSE SERPL-MCNC: 80 MG/DL (ref 65–99)
HCT VFR BLD AUTO: 25.6 % (ref 37–47)
HGB BLD-MCNC: 6.2 G/DL (ref 12–16)
HYPOCHROMIA BLD QL SMEAR: ABNORMAL
INR PPP: 1.14 (ref 0.87–1.13)
LG PLATELETS BLD QL SMEAR: NORMAL
LIPASE SERPL-CCNC: 47 U/L (ref 11–82)
LYMPHOCYTES # BLD AUTO: 1.86 K/UL (ref 1–4.8)
LYMPHOCYTES NFR BLD: 44.2 % (ref 22–41)
MANUAL DIFF BLD: NORMAL
MCH RBC QN AUTO: 14.7 PG (ref 27–33)
MCHC RBC AUTO-ENTMCNC: 24.2 G/DL (ref 33.6–35)
MCV RBC AUTO: 60.8 FL (ref 81.4–97.8)
MICROCYTES BLD QL SMEAR: ABNORMAL
MONOCYTES # BLD AUTO: 0.26 K/UL (ref 0–0.85)
MONOCYTES NFR BLD AUTO: 6.2 % (ref 0–13.4)
MORPHOLOGY BLD-IMP: NORMAL
NEUTROPHILS # BLD AUTO: 2.01 K/UL (ref 2–7.15)
NEUTROPHILS NFR BLD: 47.8 % (ref 44–72)
NRBC # BLD AUTO: 0 K/UL
NRBC BLD-RTO: 0 /100 WBC
OVALOCYTES BLD QL SMEAR: NORMAL
PLATELET # BLD AUTO: 199 K/UL (ref 164–446)
PLATELET BLD QL SMEAR: NORMAL
POIKILOCYTOSIS BLD QL SMEAR: NORMAL
POTASSIUM SERPL-SCNC: 3.8 MMOL/L (ref 3.6–5.5)
PRODUCT TYPE UPROD: NORMAL
PROT SERPL-MCNC: 7.3 G/DL (ref 6–8.2)
PROTHROMBIN TIME: 14.3 SEC (ref 12–14.6)
RBC # BLD AUTO: 4.21 M/UL (ref 4.2–5.4)
RBC BLD AUTO: PRESENT
RH BLD: NORMAL
SCHISTOCYTES BLD QL SMEAR: NORMAL
SODIUM SERPL-SCNC: 140 MMOL/L (ref 135–145)
TARGETS BLD QL SMEAR: NORMAL
TROPONIN I SERPL-MCNC: <0.01 NG/ML (ref 0–0.04)
UNIT STATUS USTAT: NORMAL
WBC # BLD AUTO: 4.2 K/UL (ref 4.8–10.8)

## 2018-04-12 PROCEDURE — 80053 COMPREHEN METABOLIC PANEL: CPT

## 2018-04-12 PROCEDURE — P9016 RBC LEUKOCYTES REDUCED: HCPCS

## 2018-04-12 PROCEDURE — 83880 ASSAY OF NATRIURETIC PEPTIDE: CPT

## 2018-04-12 PROCEDURE — 83690 ASSAY OF LIPASE: CPT

## 2018-04-12 PROCEDURE — 93005 ELECTROCARDIOGRAM TRACING: CPT | Performed by: EMERGENCY MEDICINE

## 2018-04-12 PROCEDURE — 85027 COMPLETE CBC AUTOMATED: CPT

## 2018-04-12 PROCEDURE — 93005 ELECTROCARDIOGRAM TRACING: CPT

## 2018-04-12 PROCEDURE — 36415 COLL VENOUS BLD VENIPUNCTURE: CPT

## 2018-04-12 PROCEDURE — 86850 RBC ANTIBODY SCREEN: CPT

## 2018-04-12 PROCEDURE — 84484 ASSAY OF TROPONIN QUANT: CPT

## 2018-04-12 PROCEDURE — 99285 EMERGENCY DEPT VISIT HI MDM: CPT

## 2018-04-12 PROCEDURE — 85007 BL SMEAR W/DIFF WBC COUNT: CPT

## 2018-04-12 PROCEDURE — 85610 PROTHROMBIN TIME: CPT

## 2018-04-12 PROCEDURE — 86901 BLOOD TYPING SEROLOGIC RH(D): CPT

## 2018-04-12 PROCEDURE — 86900 BLOOD TYPING SEROLOGIC ABO: CPT

## 2018-04-12 PROCEDURE — 71045 X-RAY EXAM CHEST 1 VIEW: CPT

## 2018-04-12 PROCEDURE — 85730 THROMBOPLASTIN TIME PARTIAL: CPT

## 2018-04-12 PROCEDURE — 86923 COMPATIBILITY TEST ELECTRIC: CPT

## 2018-04-12 PROCEDURE — 36430 TRANSFUSION BLD/BLD COMPNT: CPT

## 2018-04-12 ASSESSMENT — ENCOUNTER SYMPTOMS
HEADACHES: 0
SHORTNESS OF BREATH: 0
BLOOD IN STOOL: 0
ROS GI COMMENTS: NEGATIVE HEMATEMESIS
NECK PAIN: 0

## 2018-04-12 ASSESSMENT — LIFESTYLE VARIABLES: DO YOU DRINK ALCOHOL: NO

## 2018-04-12 ASSESSMENT — PAIN SCALES - GENERAL: PAINLEVEL_OUTOF10: 3

## 2018-04-13 ENCOUNTER — PATIENT OUTREACH (OUTPATIENT)
Dept: HEALTH INFORMATION MANAGEMENT | Facility: OTHER | Age: 43
End: 2018-04-13

## 2018-04-13 NOTE — DISCHARGE INSTRUCTIONS
Your labs today showed low blood levels which should've been corrected after we had given you have your transfusion. You will need to follow up with your primary care physician as soon as possible. Given the you are having chest pain when your blood levels drop below normal I do believe it's important that he follow up with a cardiologist and therefore we have scheduled an appointment for you. You should receive a call from our cardiology department within the next 3 days. If you develop diffuse arm numbness weakness or weakness unless otherwise please return to the emergency department. She developed headache or fever please return immediately.  Blood Transfusion, Care After  These instructions give you information about caring for yourself after your procedure. Your doctor may also give you more specific instructions. Call your doctor if you have any problems or questions after your procedure.   HOME CARE  · Take medicines only as told by your doctor. Ask your doctor if you can take an over-the-counter pain reliever if you have a fever or headache a day or two after your procedure.  · Return to your normal activities as told by your doctor.  GET HELP IF:   · You develop redness or irritation at your IV site.  · You have a fever, chills, or a headache that does not go away.  · Your pee (urine) is darker than normal.  · Your urine turns:  ¨ Pink.  ¨ Red.  ¨ Brown.  · The white part of your eye turns yellow (jaundice).  · You feel weak after doing your normal activities.  GET HELP RIGHT AWAY IF:   · You have trouble breathing.  · You have fever and chills and you also have:  ¨ Anxiety.  ¨ Chest or back pain.  ¨ Flushed or pink skin.  ¨ Clammy or sweaty skin.  ¨ A fast heartbeat.  ¨ A sick feeling in your stomach (nausea).     This information is not intended to replace advice given to you by your health care provider. Make sure you discuss any questions you have with your health care provider.     Document Released:  01/08/2016 Document Reviewed: 01/08/2016  [employer] Interactive Patient Education ©2016 [employer]    Anemia, Nonspecific  Anemia is a condition in which the concentration of red blood cells or hemoglobin in the blood is below normal. Hemoglobin is a substance in red blood cells that carries oxygen to the tissues of the body. Anemia results in not enough oxygen reaching these tissues.  What are the causes?  Common causes of anemia include:  · Excessive bleeding. Bleeding may be internal or external. This includes excessive bleeding from periods (in women) or from the intestine.  · Poor nutrition.  · Chronic kidney, thyroid, and liver disease.  · Bone marrow disorders that decrease red blood cell production.  · Cancer and treatments for cancer.  · HIV, AIDS, and their treatments.  · Spleen problems that increase red blood cell destruction.  · Blood disorders.  · Excess destruction of red blood cells due to infection, medicines, and autoimmune disorders.  What are the signs or symptoms?  · Minor weakness.  · Dizziness.  · Headache.  · Palpitations.  · Shortness of breath, especially with exercise.  · Paleness.  · Cold sensitivity.  · Indigestion.  · Nausea.  · Difficulty sleeping.  · Difficulty concentrating.  Symptoms may occur suddenly or they may develop slowly.  How is this diagnosed?  Additional blood tests are often needed. These help your health care provider determine the best treatment. Your health care provider will check your stool for blood and look for other causes of blood loss.  How is this treated?  Treatment varies depending on the cause of the anemia. Treatment can include:  · Supplements of iron, vitamin B12, or folic acid.  · Hormone medicines.  · A blood transfusion. This may be needed if blood loss is severe.  · Hospitalization. This may be needed if there is significant continual blood loss.  · Dietary changes.  · Spleen removal.  Follow these instructions at home:  Keep all follow-up  appointments. It often takes many weeks to correct anemia, and having your health care provider check on your condition and your response to treatment is very important.  Get help right away if:  · You develop extreme weakness, shortness of breath, or chest pain.  · You become dizzy or have trouble concentrating.  · You develop heavy vaginal bleeding.  · You develop a rash.  · You have bloody or black, tarry stools.  · You faint.  · You vomit up blood.  · You vomit repeatedly.  · You have abdominal pain.  · You have a fever or persistent symptoms for more than 2-3 days.  · You have a fever and your symptoms suddenly get worse.  · You are dehydrated.  This information is not intended to replace advice given to you by your health care provider. Make sure you discuss any questions you have with your health care provider.  Document Released: 01/25/2006 Document Revised: 05/31/2017 Document Reviewed: 06/13/2014  [employer] Interactive Patient Education © 2017 [employer]

## 2018-04-13 NOTE — ED NOTES
Blood drawn and sent to lab by ED tech.    Patient to lobby and instructed to inform staff of any needs.

## 2018-04-13 NOTE — ED TRIAGE NOTES
Pt ambulated to triage with   Chief Complaint   Patient presents with   • Sent by MD     for the last week, arm tightness/numbness, SOB, chest tightness, seen by MD, Dr Castle  and sent here.     • Chest Pain     tightness; on and off, started week ago.   • Numbness     left arm for the last week.     Pt to EKG.  Pt Informed regarding triage process and verbalized understanding to inform triage tech or RN for any changes in condition. Placed in lobby after EKG.

## 2018-04-13 NOTE — ED PROVIDER NOTES
ED Provider Note    Scribed for Zeferino Rucker M.D. by Toño Magallon. 4/12/2018  9:15 PM    Means of arrival: Walk in  History obtained by: Patient  Limitations: None      CHIEF COMPLAINT  Chief Complaint   Patient presents with   • Sent by MD     for the last week, arm tightness/numbness, SOB, chest tightness, seen by MD, Dr Castle  and sent here.     • Chest Pain     tightness; on and off, started week ago.   • Numbness     left arm for the last week.       ATIYA Catalan is a 42 y.o. female who presents to the ED after being sent by her MD for evaluation of chest pain and left arm numbness. Patient has a history of gastric bypass years ago. She has been having anemia since having this surgery. Each time her hemoglobin becomes low, she typically experiences chest tightness when walking long distances. Patient has been transfused for this in the past, but denies any recent hospital admission for this. Her last transfusion was in November 2017. The chest tightness is typically resolved when her hemoglobin is back to normal. For her current symptoms, patient reports having similar chest tightness when walking long distances.  The chest tightness is constant. Patient also reports having left arm numbness over the last week. She denies any numbness or weakness anywhere else. She denies any changes in vision any difficulty ambulating any difficulty manipulating fine objects any dizziness or vertigo. She denies any hematemesis, blood in stools, headache, neck pain, vision changes, or exertional shortness of breath. Patient denies having any prior cardiac work ups.      REVIEW OF SYSTEMS  Review of Systems   Eyes:        Negative vision changes   Respiratory: Negative for shortness of breath.    Cardiovascular: Positive for chest pain.   Gastrointestinal: Negative for blood in stool.        Negative hematemesis   Musculoskeletal: Negative for neck pain.   Neurological: Negative for headaches.        Positive  "left arm numbness   All other systems reviewed and are negative.      See HPI for further details.   C    PAST MEDICAL HISTORY   has a past medical history of Anemia (11/21/2017) and Dental disorder (11/20/2017).    SOCIAL HISTORY  Social History     Social History Main Topics   • Smoking status: Never Smoker   • Smokeless tobacco: Never Used   • Alcohol use No   • Drug use: No       SURGICAL HISTORY   has a past surgical history that includes gastric bypass laparoscopic; vaginal hysterectomy scope total (11/28/2017); salpingectomy (Bilateral, 11/28/2017); anterior and posterior repair (11/28/2017); enterocele repair (11/28/2017); bladder sling female (11/28/2017); and vaginal suspension (11/28/2017).    CURRENT MEDICATIONS  No current facility-administered medications on file prior to encounter.      Current Outpatient Prescriptions on File Prior to Encounter   Medication Sig Dispense Refill   • sertraline (ZOLOFT) 100 MG Tab Take 100 mg by mouth every day.     • buPROPion SR (WELLBUTRIN-SR) 150 MG TABLET SR 12 HR sustained-release tablet Take 150 mg by mouth every day.     • AMOXICILLIN PO Take  by mouth.        ALLERGIES  No Known Allergies    PHYSICAL EXAM  /83   Pulse 80   Temp 36.9 °C (98.4 °F)   Resp 17   Ht 1.626 m (5' 4\")   Wt 70.2 kg (154 lb 12.2 oz)   LMP  (LMP Unknown) Comment: hcg dos  SpO2 100%   BMI 26.57 kg/m²   Constitutional: Well developed, Well nourished, No acute distress, Non-toxic appearance.   HENT: Normocephalic, Atraumatic,  Eyes: PERRL, EOM intact  Neck: Supple, no meningismus  Lymphatic: No lymphadenopathy noted.   Cardiovascular: Regular rate and rhythm  Lungs: Clear to auscultation bilaterally, easy unlabored respirations   Abdomen: Bowel sounds normal, Soft, No tenderness  Skin: Warm, Dry, no rash  Back: No tenderness, No CVA tenderness.   Extremities: No edema to lower extremities  Neurologic: Alert and oriented, appropriate, follows commands, moving all extremities, " normal speech. Distal and proximal strength 5/5 in upper and lower extremities. Normal gait. No dysmetria. No visual field deficits. Cranial nerves intact. Paraesthesias overlying left deltoid, sensation proximal and distal to this is intact without any deficit or discrepancy.   Psychiatric: Affect normal      DIAGNOSTIC STUDIES / PROCEDURES    EKG  See labs. Interpreted by me.        LABS  Results for orders placed or performed during the hospital encounter of 04/12/18   Troponin   Result Value Ref Range    Troponin I <0.01 0.00 - 0.04 ng/mL   Btype Natriuretic Peptide   Result Value Ref Range    B Natriuretic Peptide 46 0 - 100 pg/mL   CBC with Differential   Result Value Ref Range    WBC 4.2 (L) 4.8 - 10.8 K/uL    RBC 4.21 4.20 - 5.40 M/uL    Hemoglobin 6.2 (L) 12.0 - 16.0 g/dL    Hematocrit 25.6 (L) 37.0 - 47.0 %    MCV 60.8 (L) 81.4 - 97.8 fL    MCH 14.7 (L) 27.0 - 33.0 pg    MCHC 24.2 (L) 33.6 - 35.0 g/dL    RDW 46.5 35.9 - 50.0 fL    Platelet Count 199 164 - 446 K/uL    Nucleated RBC 0.00 /100 WBC    NRBC (Absolute) 0.00 K/uL    Neutrophils-Polys 47.80 44.00 - 72.00 %    Lymphocytes 44.20 (H) 22.00 - 41.00 %    Monocytes 6.20 0.00 - 13.40 %    Eosinophils 0.90 0.00 - 6.90 %    Basophils 0.90 0.00 - 1.80 %    Neutrophils (Absolute) 2.01 2.00 - 7.15 K/uL    Lymphs (Absolute) 1.86 1.00 - 4.80 K/uL    Monos (Absolute) 0.26 0.00 - 0.85 K/uL    Eos (Absolute) 0.04 0.00 - 0.51 K/uL    Baso (Absolute) 0.04 0.00 - 0.12 K/uL    Hypochromia 1+     Anisocytosis 2+     Microcytosis 2+    Complete Metabolic Panel (CMP)   Result Value Ref Range    Sodium 140 135 - 145 mmol/L    Potassium 3.8 3.6 - 5.5 mmol/L    Chloride 108 96 - 112 mmol/L    Co2 25 20 - 33 mmol/L    Anion Gap 7.0 0.0 - 11.9    Glucose 80 65 - 99 mg/dL    Bun 11 8 - 22 mg/dL    Creatinine 0.46 (L) 0.50 - 1.40 mg/dL    Calcium 9.1 8.5 - 10.5 mg/dL    AST(SGOT) 23 12 - 45 U/L    ALT(SGPT) 6 2 - 50 U/L    Alkaline Phosphatase 159 (H) 30 - 99 U/L    Total  Bilirubin 0.4 0.1 - 1.5 mg/dL    Albumin 4.4 3.2 - 4.9 g/dL    Total Protein 7.3 6.0 - 8.2 g/dL    Globulin 2.9 1.9 - 3.5 g/dL    A-G Ratio 1.5 g/dL   Prothrombin Time   Result Value Ref Range    PT 14.3 12.0 - 14.6 sec    INR 1.14 (H) 0.87 - 1.13   APTT   Result Value Ref Range    APTT 33.3 24.7 - 36.0 sec   Lipase   Result Value Ref Range    Lipase 47 11 - 82 U/L   ESTIMATED GFR   Result Value Ref Range    GFR If African American >60 >60 mL/min/1.73 m 2    GFR If Non African American >60 >60 mL/min/1.73 m 2   DIFFERENTIAL MANUAL   Result Value Ref Range    Manual Diff Status PERFORMED    PERIPHERAL SMEAR REVIEW   Result Value Ref Range    Peripheral Smear Review see below    PLATELET ESTIMATE   Result Value Ref Range    Plt Estimation Normal    MORPHOLOGY   Result Value Ref Range    RBC Morphology Present     Large Platelets 2+     Giant Platelets 1+     Poikilocytosis 1+     Ovalocytes 1+     Schistocytes 1+     Target Cells 1+    COD (Adult) - Type and Crossmatch only order if transfusing RBC'S   Result Value Ref Range    ABO Grouping Only O     Rh Grouping Only POS     Antibody Screen-Cod NEG     Component R       R3                  Red Blood Cells3    [id number]   issued       18   22:34      Product Type Red Blood Cells LR Pheresis     Dispense Status Issued     Unit Number (Barcoded) [id number]     Product Code (Barcoded) [id number]     Blood Type (Barcoded) 5100    EKG (NOW)   Result Value Ref Range    Report       [facility] Emergency Dept.    Test Date:  2018  Pt Name:    LOU CHAN                Department: ER  MRN:        [id number]                      Room:  Gender:     Female                       Technician: [id number]  :        1975                   Requested By:ER TRIAGE PROTOCOL  Order #:    [id number]                    Reggie MD: Alka Mcgarry MD    Measurements  Intervals                                Axis  Rate:       66                            P:          19  GA:         180                          QRS:        32  QRSD:       82                           T:          27  QT:         396  QTc:        415    Interpretive Statements  EKG is normal sinus rhythm normal axis normal intervals and no ST changes  consistent with acute regional ischemia  Electronically Signed On 4- 21:21:38 PDT by Alka Mcgarry MD          RADIOLOGY  DX-CHEST-LIMITED (1 VIEW)   Final Result      No acute cardiopulmonary disease.            COURSE & MEDICAL DECISION MAKING  Pertinent Labs & Imaging studies reviewed. (See chart for details)    9:15 PM Patient seen and examined at bedside. The patient presents with chest pain and left arm numbness. Ordered for DX chest, COD, estimated GFR, differential manual, peripheral smear review, platelet estimate, morphology, troponin, BNP, CBC, CMP, PT, APTT, lipase, EKG to evaluate.  Patient with focal paresthesias only overlying her deltoid. There is no proximal or distal paresthesias in patient with sensation that is actually intact without any major objective discrepancy. She is able to feel both soft and painful stimuli without difficulty on the affected region. There is no overlying rash. There is no associated neck pain or neck stiffness or any exacerbation of symptoms with range of motion of the neck. Patient without any other neurologic sequelae and no headache. I believe that neuro imaging is highly unlikely to reveal any actionable information given my incredibly low suspicion of CVA and is very well-appearing patient with symptoms highly atypical of a CVA. Patient's chest pain is likely secondary to her anemia however I do believe that given that her chest pain is exacerbated when she has anemia it is important that she follows up with cardiology for further workup. I scheduled this for patient. Patient understands to follow up with her primary care physician for ongoing workup and this. I suspect possible mild  neuropraxia.      I (Zeferino Rucker M.D.) certify that I have explained to the patient or the patient’s legal representative, the following:    (i)     Explained the Blood Transfusion procedure to be undertaken;  (ii)    Explained alternative treatments and their general nature and risks; and  (iii)   Explained the risks, and extent of risk, to the Blood Transfusion procedure.  Risks included, but are not limited to the following:  mild allergic reactions, hemolytic reaction, and possible exposure to infectious agents such as hepatitis, cytomegalovirus, infectious mononucleosis, and acquired immune deficiency syndrome (AIDS)    I have explained all of the above and have answered all patient questions arising regarding the procedure to be taken, and I believe that the patient understands what I have explained.    Date 4/12/2018  This form is electronically signed by Zeferino Rucker M.D.         11:51 PM Patient reevaluated at bedside. Symptoms have resolved. The patient will be discharged with instructions regarding supportive care and medications. Will set up outpatient follow up with cardiology. Discussed indications for seeking immediate medical attention. Patient was given the opportunity for questions. The patient understands and agrees.       The patient will return for new or worsening symptoms and is stable at the time of discharge.    The patient is referred to a primary physician for blood pressure management, diabetic screening, and for all other preventative health concerns.      DISPOSITION:  Patient will be discharged home in stable condition.    FOLLOW UP:  No follow-up provider specified.    OUTPATIENT MEDICATIONS:  New Prescriptions    No medications on file        FINAL IMPRESSION  1. Deficiency anemia, neuropraxia      Toño MAGAÑA (Edd), am scribing for, and in the presence of, Zeferino Rucker M.D..    Electronically signed by: Toño Magallon (Edd), 4/12/2018    Zeferino MAGAÑA  JUAN Rucker. personally performed the services described in this documentation, as scribed by Toño Magallon in my presence, and it is both accurate and complete.    The note accurately reflects work and decisions made by me.  Zeferino Rucker  4/13/2018  1:37 AM

## 2018-04-16 ENCOUNTER — OFFICE VISIT (OUTPATIENT)
Dept: CARDIOLOGY | Facility: MEDICAL CENTER | Age: 43
End: 2018-04-16
Payer: MEDICAID

## 2018-04-16 VITALS
BODY MASS INDEX: 25.95 KG/M2 | HEIGHT: 64 IN | HEART RATE: 94 BPM | WEIGHT: 152 LBS | SYSTOLIC BLOOD PRESSURE: 112 MMHG | OXYGEN SATURATION: 100 % | DIASTOLIC BLOOD PRESSURE: 72 MMHG

## 2018-04-16 DIAGNOSIS — R07.89 OTHER CHEST PAIN: ICD-10-CM

## 2018-04-16 DIAGNOSIS — D64.9 ANEMIA, UNSPECIFIED TYPE: ICD-10-CM

## 2018-04-16 DIAGNOSIS — D64.9 SYMPTOMATIC ANEMIA: ICD-10-CM

## 2018-04-16 PROCEDURE — 99244 OFF/OP CNSLTJ NEW/EST MOD 40: CPT | Performed by: INTERNAL MEDICINE

## 2018-04-16 ASSESSMENT — ENCOUNTER SYMPTOMS
SHORTNESS OF BREATH: 0
BRUISES/BLEEDS EASILY: 0
HEADACHES: 0
FALLS: 0
BLOOD IN STOOL: 0
DOUBLE VISION: 0
CHILLS: 0
EYE PAIN: 0
PALPITATIONS: 0
ABDOMINAL PAIN: 0
PND: 0
ORTHOPNEA: 0
BLURRED VISION: 0
VOMITING: 0
COUGH: 0
HALLUCINATIONS: 0
NAUSEA: 0
CLAUDICATION: 0
SENSORY CHANGE: 0
DIZZINESS: 0
WEIGHT LOSS: 0
SPEECH CHANGE: 0
EYE DISCHARGE: 0
FEVER: 0
DEPRESSION: 0
MYALGIAS: 0
LOSS OF CONSCIOUSNESS: 0

## 2018-04-16 NOTE — PROGRESS NOTES
Chief Complaint   Patient presents with   • Chest Pain     Hospital Follow Up       Subjective:   Deanne Catalan is a 42 y.o. female who presents today for cardiac care and evaluation for chest pain. Patient has chest pain at sporadic times. No specific precipitating factors or worsening factors. Chest pain is described to be pressure-like sensation however localized at anterior chest without radition. Lasting for seconds to minutes. No prior cardiac problems. No prior cardiac workup.    She also has severe anemia for quite sometimes. This is not new.    The chest pain is new issue.    Past Medical History:   Diagnosis Date   • Anemia 11/21/2017   • Dental disorder 11/20/2017    recent dental surgery, for infected tooth     Past Surgical History:   Procedure Laterality Date   • VAGINAL HYSTERECTOMY SCOPE TOTAL  11/28/2017    Procedure: VAGINAL HYSTERECTOMY SCOPE TOTAL;  Surgeon: Jose C Villarreal M.D.;  Location: SURGERY SAME DAY [facility];  Service: Gynecology   • SALPINGECTOMY Bilateral 11/28/2017    Procedure: SALPINGECTOMY;  Surgeon: Jose C Villarreal M.D.;  Location: SURGERY SAME DAY [facility];  Service: Gynecology   • ANTERIOR AND POSTERIOR REPAIR  11/28/2017    Procedure: ANTERIOR AND POSTERIOR REPAIR;  Surgeon: Jose C Villarreal M.D.;  Location: SURGERY SAME DAY [facility];  Service: Gynecology   • ENTEROCELE REPAIR  11/28/2017    Procedure: ENTEROCELE REPAIR, PERINEOPLASTY  ;  Surgeon: Jose C Villarreal M.D.;  Location: SURGERY SAME DAY [facility];  Service: Gynecology   • BLADDER SLING FEMALE  11/28/2017    Procedure: BLADDER SLING FEMALE - TOT;  Surgeon: Jose C Villarreal M.D.;  Location: SURGERY SAME DAY [facility];  Service: Gynecology   • VAGINAL SUSPENSION  11/28/2017    Procedure: VAGINAL SUSPENSION- POSS SACROSPINOUS VAULT SUSPENSION  ;  Surgeon: Jose C Villarreal M.D.;  Location: SURGERY SAME DAY [facility];  Service: Gynecology   • GASTRIC BYPASS LAPAROSCOPIC       History reviewed. No  "pertinent family history.  Social History     Social History   • Marital status: Single     Spouse name: N/A   • Number of children: N/A   • Years of education: N/A     Occupational History   • Not on file.     Social History Main Topics   • Smoking status: Never Smoker   • Smokeless tobacco: Never Used   • Alcohol use No   • Drug use: No   • Sexual activity: Not on file     Other Topics Concern   • Not on file     Social History Narrative   • No narrative on file     No Known Allergies  Outpatient Encounter Prescriptions as of 4/16/2018   Medication Sig Dispense Refill   • sertraline (ZOLOFT) 100 MG Tab Take 100 mg by mouth every day.     • buPROPion SR (WELLBUTRIN-SR) 150 MG TABLET SR 12 HR sustained-release tablet Take 150 mg by mouth every day.     • AMOXICILLIN PO Take  by mouth.       No facility-administered encounter medications on file as of 4/16/2018.      Review of Systems   Constitutional: Negative for chills, fever, malaise/fatigue and weight loss.   HENT: Negative for ear discharge, ear pain, hearing loss and nosebleeds.    Eyes: Negative for blurred vision, double vision, pain and discharge.   Respiratory: Negative for cough and shortness of breath.    Cardiovascular: Positive for chest pain. Negative for palpitations, orthopnea, claudication, leg swelling and PND.   Gastrointestinal: Negative for abdominal pain, blood in stool, melena, nausea and vomiting.   Genitourinary: Negative for dysuria and hematuria.   Musculoskeletal: Negative for falls, joint pain and myalgias.   Skin: Negative for itching and rash.   Neurological: Negative for dizziness, sensory change, speech change, loss of consciousness and headaches.   Endo/Heme/Allergies: Negative for environmental allergies. Does not bruise/bleed easily.   Psychiatric/Behavioral: Negative for depression, hallucinations and suicidal ideas.        Objective:   /72   Pulse 94   Ht 1.626 m (5' 4\")   Wt 68.9 kg (152 lb)   LMP  (LMP Unknown)   " SpO2 100%   BMI 26.09 kg/m²     Physical Exam   Constitutional: She is oriented to person, place, and time. No distress.   HENT:   Head: Normocephalic and atraumatic.   Right Ear: External ear normal.   Left Ear: External ear normal.   Eyes: Right eye exhibits no discharge. Left eye exhibits no discharge.   Neck: No JVD present. No thyromegaly present.   Cardiovascular: Normal rate, regular rhythm, normal heart sounds and intact distal pulses.  Exam reveals no gallop and no friction rub.    No murmur heard.  Pulmonary/Chest: Breath sounds normal. No respiratory distress.   Abdominal: Bowel sounds are normal. She exhibits no distension. There is no tenderness.   Musculoskeletal: She exhibits no edema or tenderness.   Neurological: She is alert and oriented to person, place, and time. No cranial nerve deficit.   Skin: Skin is warm and dry. She is not diaphoretic.   Psychiatric: She has a normal mood and affect. Her behavior is normal.   Nursing note and vitals reviewed.      Assessment:     1. Anemia, unspecified type  ECHOCARDIOGRAM COMP W/O CONT    TREADMILL STRESS   2. Symptomatic anemia  ECHOCARDIOGRAM COMP W/O CONT    TREADMILL STRESS   3. Other chest pain  ECHOCARDIOGRAM COMP W/O CONT    TREADMILL STRESS       Medical Decision Making:  Today's Assessment / Status / Plan:   At this time, to further risk stratify, I will order a transthoracic echocardiogram to assess cardiac and valvular functions. I will also order a treadmill stress test (to avoid radiation exposure in young patients) to assess for coronary ischemia.    I will see patient back in clinic with lab tests and studies results in 6 months.    I thank you for referring patient to our Cardiology Clinic today.

## 2018-04-16 NOTE — LETTER
[facility]   [address], [facility] [address]  [place] [address]  Phone: [phone]  Fax: [phone]              Deanne Catalan  1975    Encounter Date: 4/16/2018    Ad Bentley M.D.          PROGRESS NOTE:  Chief Complaint   Patient presents with   • Chest Pain     Hospital Follow Up       Subjective:   Deanne Catalan is a 42 y.o. female who presents today for cardiac care and evaluation for chest pain. Patient has chest pain at sporadic times. No specific precipitating factors or worsening factors. Chest pain is described to be pressure-like sensation however localized at anterior chest without radition. Lasting for seconds to minutes. No prior cardiac problems. No prior cardiac workup.    She also has severe anemia for quite sometimes. This is not new.    The chest pain is new issue.    Past Medical History:   Diagnosis Date   • Anemia 11/21/2017   • Dental disorder 11/20/2017    recent dental surgery, for infected tooth     Past Surgical History:   Procedure Laterality Date   • VAGINAL HYSTERECTOMY SCOPE TOTAL  11/28/2017    Procedure: VAGINAL HYSTERECTOMY SCOPE TOTAL;  Surgeon: Jose C Villarreal M.D.;  Location: SURGERY SAME DAY [facility];  Service: Gynecology   • SALPINGECTOMY Bilateral 11/28/2017    Procedure: SALPINGECTOMY;  Surgeon: Jose C Villarreal M.D.;  Location: SURGERY SAME DAY [facility];  Service: Gynecology   • ANTERIOR AND POSTERIOR REPAIR  11/28/2017    Procedure: ANTERIOR AND POSTERIOR REPAIR;  Surgeon: Jose C Villarreal M.D.;  Location: SURGERY SAME DAY [facility];  Service: Gynecology   • ENTEROCELE REPAIR  11/28/2017    Procedure: ENTEROCELE REPAIR, PERINEOPLASTY  ;  Surgeon: Jose C Villarreal M.D.;  Location: SURGERY SAME DAY [facility];  Service: Gynecology   • BLADDER SLING FEMALE  11/28/2017    Procedure: BLADDER SLING FEMALE - TOT;  Surgeon: Jose C Villarreal M.D.;  Location: SURGERY SAME DAY [facility];  Service: Gynecology   •  VAGINAL SUSPENSION  11/28/2017    Procedure: VAGINAL SUSPENSION- POSS SACROSPINOUS VAULT SUSPENSION  ;  Surgeon: Jose C Villarreal M.D.;  Location: SURGERY SAME DAY [facility];  Service: Gynecology   • GASTRIC BYPASS LAPAROSCOPIC       History reviewed. No pertinent family history.  Social History     Social History   • Marital status: Single     Spouse name: N/A   • Number of children: N/A   • Years of education: N/A     Occupational History   • Not on file.     Social History Main Topics   • Smoking status: Never Smoker   • Smokeless tobacco: Never Used   • Alcohol use No   • Drug use: No   • Sexual activity: Not on file     Other Topics Concern   • Not on file     Social History Narrative   • No narrative on file     No Known Allergies  Outpatient Encounter Prescriptions as of 4/16/2018   Medication Sig Dispense Refill   • sertraline (ZOLOFT) 100 MG Tab Take 100 mg by mouth every day.     • buPROPion SR (WELLBUTRIN-SR) 150 MG TABLET SR 12 HR sustained-release tablet Take 150 mg by mouth every day.     • AMOXICILLIN PO Take  by mouth.       No facility-administered encounter medications on file as of 4/16/2018.      Review of Systems   Constitutional: Negative for chills, fever, malaise/fatigue and weight loss.   HENT: Negative for ear discharge, ear pain, hearing loss and nosebleeds.    Eyes: Negative for blurred vision, double vision, pain and discharge.   Respiratory: Negative for cough and shortness of breath.    Cardiovascular: Positive for chest pain. Negative for palpitations, orthopnea, claudication, leg swelling and PND.   Gastrointestinal: Negative for abdominal pain, blood in stool, melena, nausea and vomiting.   Genitourinary: Negative for dysuria and hematuria.   Musculoskeletal: Negative for falls, joint pain and myalgias.   Skin: Negative for itching and rash.   Neurological: Negative for dizziness, sensory change, speech change, loss of consciousness and headaches.   Endo/Heme/Allergies: Negative  "for environmental allergies. Does not bruise/bleed easily.   Psychiatric/Behavioral: Negative for depression, hallucinations and suicidal ideas.        Objective:   /72   Pulse 94   Ht 1.626 m (5' 4\")   Wt 68.9 kg (152 lb)   LMP  (LMP Unknown)   SpO2 100%   BMI 26.09 kg/m²      Physical Exam   Constitutional: She is oriented to person, place, and time. No distress.   HENT:   Head: Normocephalic and atraumatic.   Right Ear: External ear normal.   Left Ear: External ear normal.   Eyes: Right eye exhibits no discharge. Left eye exhibits no discharge.   Neck: No JVD present. No thyromegaly present.   Cardiovascular: Normal rate, regular rhythm, normal heart sounds and intact distal pulses.  Exam reveals no gallop and no friction rub.    No murmur heard.  Pulmonary/Chest: Breath sounds normal. No respiratory distress.   Abdominal: Bowel sounds are normal. She exhibits no distension. There is no tenderness.   Musculoskeletal: She exhibits no edema or tenderness.   Neurological: She is alert and oriented to person, place, and time. No cranial nerve deficit.   Skin: Skin is warm and dry. She is not diaphoretic.   Psychiatric: She has a normal mood and affect. Her behavior is normal.   Nursing note and vitals reviewed.      Assessment:     1. Anemia, unspecified type  ECHOCARDIOGRAM COMP W/O CONT    TREADMILL STRESS   2. Symptomatic anemia  ECHOCARDIOGRAM COMP W/O CONT    TREADMILL STRESS   3. Other chest pain  ECHOCARDIOGRAM COMP W/O CONT    TREADMILL STRESS       Medical Decision Making:  Today's Assessment / Status / Plan:   At this time, to further risk stratify, I will order a transthoracic echocardiogram to assess cardiac and valvular functions. I will also order a treadmill stress test (to avoid radiation exposure in young patients) to assess for coronary ischemia.    I will see patient back in clinic with lab tests and studies results in 6 months.    I thank you for referring patient to our Cardiology " Clinic today.        TIM DuranPYusef  [address]  [place] [address]  VIA Facsimile: [phone]

## 2018-04-30 ENCOUNTER — HOSPITAL ENCOUNTER (OUTPATIENT)
Dept: CARDIOLOGY | Facility: MEDICAL CENTER | Age: 43
End: 2018-04-30
Attending: INTERNAL MEDICINE
Payer: MEDICAID

## 2018-04-30 DIAGNOSIS — D64.9 SYMPTOMATIC ANEMIA: ICD-10-CM

## 2018-04-30 DIAGNOSIS — D64.9 ANEMIA, UNSPECIFIED TYPE: ICD-10-CM

## 2018-04-30 DIAGNOSIS — R07.89 OTHER CHEST PAIN: ICD-10-CM

## 2018-04-30 PROCEDURE — 93306 TTE W/DOPPLER COMPLETE: CPT

## 2018-04-30 PROCEDURE — 93306 TTE W/DOPPLER COMPLETE: CPT | Mod: 26 | Performed by: INTERNAL MEDICINE

## 2018-05-01 LAB
LV EJECT FRACT  99904: 60
LV EJECT FRACT MOD 2C 99903: 54.55
LV EJECT FRACT MOD 4C 99902: 59.44
LV EJECT FRACT MOD BP 99901: 59.79

## 2018-05-01 NOTE — PROGRESS NOTES
Dear Mily,    Can you please let Deanne Hossein know that result is ok and I will see patient as scheduled?    Thanks Nick Minor.

## 2018-08-29 NOTE — H&P
DATE OF SCHEDULED SURGERY:  2018    REASON FOR SURGERY:  Recurrent pelvic floor relaxation, pelvic pain, mixed   urinary incontinence, strong type 2 stress urinary incontinence component as   demonstrated on urodynamic studies in addition to persistence dyspareunia.    HISTORY OF PRESENT ILLNESS:  This is a 42-year-old woman  2, para 2,   status post a previous hysterectomy and pelvic floor repair in 2017 who states   that she initially did well; however, heard a pop in her pelvis with   subsequent recurrent pelvic floor relaxation.  She does have a longstanding   history of pelvic pain and dyspareunia, which were marginally improved after   surgery; however, have recurred in addition to mixed urinary incontinence   symptoms and strong type 2 stress urinary incontinence as demonstrated and   confirmed on urodynamic studies, now interested in proceeding forward with   attempted recurrent pelvic floor repair in the form of a native tissue repair   with an anterior and posterior vaginal repair, enterocele repair,   perineoplasty, sacrospinous vault suspension, and a tension free vaginal tape   given her recurrence of urinary incontinence.  Risks, benefits, and   alternatives of all individual portions of the surgery in addition to the   risks related to tension free vaginal tape were addressed with the patient.    She has asked appropriate questions, signed the appropriate consents, and   wishes to proceed forward with surgery as planned.  She does understand the   limitations of surgery and addressing her pelvic pain and dyspareunia, both of   which have persisted even despite the previous surgeries.  She does   understand that these symptoms may be unimproved or actually worsened with the   surgery as described above requiring additional medical or surgical   management to address these concerns.  In addition, the possibility of   persistent urinary incontinence especially with the overactive bladder    component and limitations of addressing these symptoms.  She has asked   appropriate questions, signed the appropriate consents, and even understands   limitations of surgery and addressing concerns described above.  She is   wishing to proceed forward with surgery at this time.    REVIEW OF SYSTEMS:  Otherwise unremarkable.    PAST MEDICAL HISTORY:  Severe anemia requiring a blood transfusion, migraine   headaches; otherwise, as stated above.    FAMILY HISTORY:  Noncontributory.    PAST SURGICAL HISTORY:  She reports the pelvic floor repair surgery and   hysterectomy in 2017.    OBSTETRICAL HISTORY:  She has had 2 previous deliveries.    GYNECOLOGIC HISTORY:  She has been amenorrheic since her surgery.  She does   report a bulge at the vaginal introitus, pelvic pain, dyspareunia, overactive   bladder symptoms, strong type 2 stress urinary incontinence component, which   was demonstrated and confirmed on urodynamic study.    SOCIAL HISTORY:  She is .  She does have a history of depression and is   currently on antidepressant therapy.    SOCIAL HISTORY:  She denies use of any alcohol, tobacco, or recreational drug   use.    MEDICATIONS:  Zoloft 100 mg tablet p.o. daily.    ALLERGIES:  No known drug allergies.    PHYSICAL EXAMINATION:  GENERAL:  She is afebrile, hemodynamically stable.  VITAL SIGNS:  Please see the EMR for current vital signs.  HEART:  Regular rate and rhythm.  CHEST:  Clear to auscultation bilaterally.  ABDOMEN:  Soft, nondistended, nontender, bowel sounds are present.  PELVIC:  Shows grade II anterior and posterior and apical vaginal relaxation   with tenderness to palpation at the vaginal cuff.  No adnexal masses or   tenderness to palpation were appreciated on bimanual examination.    Rectovaginal exam confirmed the above.  She is guaiac negative.  EXTREMITIES:  Nontender.    Urodynamic studies did demonstrate and confirm type 2 stress urinary   incontinence.  Most recent pelvic  ultrasound was unremarkable.    ASSESSMENT AND PLAN:  A 42-year-old woman who has exhausted all conservative   measures, now interested in proceeding forward with attempted definitive   surgical correction of her recurrent pelvic floor relaxation, pelvic pain,   dyspareunia, which she attributes to her symptomatic pelvic floor relaxation,   overactive bladder symptoms.  Risks, benefits, and alternatives have been   addressed.  She has asked appropriate questions, signed the appropriate   consents, and wishes to proceed forward with surgery as planned.       ____________________________________     MD NOEMI SCOTT / DENISE    DD:  08/29/2018 08:57:37  DT:  08/29/2018 09:48:32    D#:  [id number]  Job#:  [id number]

## 2018-09-10 DIAGNOSIS — Z01.812 PRE-PROCEDURAL LABORATORY EXAMINATION: ICD-10-CM

## 2018-09-10 LAB
ANISOCYTOSIS BLD QL SMEAR: NORMAL
HCT VFR BLD AUTO: 47.4 % (ref 37–47)
HGB BLD-MCNC: 15 G/DL (ref 12–16)
MCH RBC QN AUTO: 26.4 PG (ref 27–33)
MCHC RBC AUTO-ENTMCNC: 31.6 G/DL (ref 33.6–35)
MCV RBC AUTO: 83.3 FL (ref 81.4–97.8)
MICROCYTES BLD QL SMEAR: NORMAL
MORPHOLOGY BLD-IMP: NORMAL
PLATELET # BLD AUTO: 205 K/UL (ref 164–446)
POLYCHROMASIA BLD QL SMEAR: NORMAL
RBC # BLD AUTO: 5.69 M/UL (ref 4.2–5.4)
RBC BLD AUTO: PRESENT
WBC # BLD AUTO: 4.8 K/UL (ref 4.8–10.8)

## 2018-09-10 PROCEDURE — 85027 COMPLETE CBC AUTOMATED: CPT

## 2018-09-10 PROCEDURE — 36415 COLL VENOUS BLD VENIPUNCTURE: CPT

## 2018-09-10 RX ORDER — VENLAFAXINE 75 MG/1
75 TABLET ORAL DAILY
COMMUNITY

## 2018-09-19 ENCOUNTER — HOSPITAL ENCOUNTER (OUTPATIENT)
Facility: MEDICAL CENTER | Age: 43
End: 2018-09-19
Attending: SPECIALIST | Admitting: SPECIALIST
Payer: MEDICAID

## 2018-09-19 VITALS
OXYGEN SATURATION: 96 % | TEMPERATURE: 98 F | RESPIRATION RATE: 16 BRPM | WEIGHT: 157.85 LBS | SYSTOLIC BLOOD PRESSURE: 118 MMHG | DIASTOLIC BLOOD PRESSURE: 76 MMHG | BODY MASS INDEX: 26.95 KG/M2 | HEART RATE: 68 BPM | HEIGHT: 64 IN

## 2018-09-19 PROCEDURE — 160029 HCHG SURGERY MINUTES - 1ST 30 MINS LEVEL 4: Performed by: SPECIALIST

## 2018-09-19 PROCEDURE — 502240 HCHG MISC OR SUPPLY RC 0272: Performed by: SPECIALIST

## 2018-09-19 PROCEDURE — 160002 HCHG RECOVERY MINUTES (STAT): Performed by: SPECIALIST

## 2018-09-19 PROCEDURE — 700101 HCHG RX REV CODE 250

## 2018-09-19 PROCEDURE — 501838 HCHG SUTURE GENERAL: Performed by: SPECIALIST

## 2018-09-19 PROCEDURE — 501330 HCHG SET, CYSTO IRRIG TUBING: Performed by: SPECIALIST

## 2018-09-19 PROCEDURE — A9270 NON-COVERED ITEM OR SERVICE: HCPCS | Performed by: SPECIALIST

## 2018-09-19 PROCEDURE — 700111 HCHG RX REV CODE 636 W/ 250 OVERRIDE (IP)

## 2018-09-19 PROCEDURE — 160048 HCHG OR STATISTICAL LEVEL 1-5: Performed by: SPECIALIST

## 2018-09-19 PROCEDURE — 160035 HCHG PACU - 1ST 60 MINS PHASE I: Performed by: SPECIALIST

## 2018-09-19 PROCEDURE — C1771 REP DEV, URINARY, W/SLING: HCPCS | Performed by: SPECIALIST

## 2018-09-19 PROCEDURE — 160041 HCHG SURGERY MINUTES - EA ADDL 1 MIN LEVEL 4: Performed by: SPECIALIST

## 2018-09-19 PROCEDURE — 500892 HCHG PACK, PERI-GYN: Performed by: SPECIALIST

## 2018-09-19 PROCEDURE — 160009 HCHG ANES TIME/MIN: Performed by: SPECIALIST

## 2018-09-19 PROCEDURE — 700102 HCHG RX REV CODE 250 W/ 637 OVERRIDE(OP): Performed by: ANESTHESIOLOGY

## 2018-09-19 PROCEDURE — 700102 HCHG RX REV CODE 250 W/ 637 OVERRIDE(OP): Performed by: SPECIALIST

## 2018-09-19 PROCEDURE — 700111 HCHG RX REV CODE 636 W/ 250 OVERRIDE (IP): Performed by: SPECIALIST

## 2018-09-19 PROCEDURE — 160036 HCHG PACU - EA ADDL 30 MINS PHASE I: Performed by: SPECIALIST

## 2018-09-19 PROCEDURE — A4338 INDWELLING CATHETER LATEX: HCPCS | Performed by: SPECIALIST

## 2018-09-19 PROCEDURE — A9270 NON-COVERED ITEM OR SERVICE: HCPCS | Performed by: ANESTHESIOLOGY

## 2018-09-19 DEVICE — SYSTEM TRANSVAGINAL MID-URETHRAL SLING ADVANTAGE FIT: Type: IMPLANTABLE DEVICE | Site: VAGINA | Status: FUNCTIONAL

## 2018-09-19 RX ORDER — HALOPERIDOL 5 MG/ML
1 INJECTION INTRAMUSCULAR
Status: DISCONTINUED | OUTPATIENT
Start: 2018-09-19 | End: 2018-09-19 | Stop reason: HOSPADM

## 2018-09-19 RX ORDER — DEXAMETHASONE SODIUM PHOSPHATE 4 MG/ML
4 INJECTION, SOLUTION INTRA-ARTICULAR; INTRALESIONAL; INTRAMUSCULAR; INTRAVENOUS; SOFT TISSUE
Status: CANCELLED | OUTPATIENT
Start: 2018-09-19

## 2018-09-19 RX ORDER — HYDROMORPHONE HYDROCHLORIDE 2 MG/ML
0.2 INJECTION, SOLUTION INTRAMUSCULAR; INTRAVENOUS; SUBCUTANEOUS
Status: DISCONTINUED | OUTPATIENT
Start: 2018-09-19 | End: 2018-09-19 | Stop reason: HOSPADM

## 2018-09-19 RX ORDER — OXYCODONE HCL 5 MG/5 ML
10 SOLUTION, ORAL ORAL
Status: COMPLETED | OUTPATIENT
Start: 2018-09-19 | End: 2018-09-19

## 2018-09-19 RX ORDER — NALOXONE HYDROCHLORIDE 0.4 MG/ML
0.1 INJECTION, SOLUTION INTRAMUSCULAR; INTRAVENOUS; SUBCUTANEOUS PRN
Status: DISCONTINUED | OUTPATIENT
Start: 2018-09-19 | End: 2018-09-19 | Stop reason: HOSPADM

## 2018-09-19 RX ORDER — HYDROMORPHONE HYDROCHLORIDE 2 MG/ML
0.25 INJECTION, SOLUTION INTRAMUSCULAR; INTRAVENOUS; SUBCUTANEOUS
Status: DISCONTINUED | OUTPATIENT
Start: 2018-09-19 | End: 2018-09-19 | Stop reason: HOSPADM

## 2018-09-19 RX ORDER — HYDROMORPHONE HYDROCHLORIDE 2 MG/ML
0.4 INJECTION, SOLUTION INTRAMUSCULAR; INTRAVENOUS; SUBCUTANEOUS
Status: DISCONTINUED | OUTPATIENT
Start: 2018-09-19 | End: 2018-09-19 | Stop reason: HOSPADM

## 2018-09-19 RX ORDER — OXYCODONE HCL 5 MG/5 ML
10 SOLUTION, ORAL ORAL
Status: DISCONTINUED | OUTPATIENT
Start: 2018-09-19 | End: 2018-09-19 | Stop reason: HOSPADM

## 2018-09-19 RX ORDER — DIPHENHYDRAMINE HYDROCHLORIDE 50 MG/ML
12.5 INJECTION INTRAMUSCULAR; INTRAVENOUS
Status: DISCONTINUED | OUTPATIENT
Start: 2018-09-19 | End: 2018-09-19 | Stop reason: HOSPADM

## 2018-09-19 RX ORDER — SIMETHICONE 80 MG
80 TABLET,CHEWABLE ORAL EVERY 8 HOURS PRN
Status: CANCELLED | OUTPATIENT
Start: 2018-09-19

## 2018-09-19 RX ORDER — SODIUM CHLORIDE, SODIUM LACTATE, POTASSIUM CHLORIDE, CALCIUM CHLORIDE 600; 310; 30; 20 MG/100ML; MG/100ML; MG/100ML; MG/100ML
INJECTION, SOLUTION INTRAVENOUS CONTINUOUS
Status: DISCONTINUED | OUTPATIENT
Start: 2018-09-19 | End: 2018-09-19

## 2018-09-19 RX ORDER — HALOPERIDOL 5 MG/ML
1 INJECTION INTRAMUSCULAR EVERY 6 HOURS PRN
Status: CANCELLED | OUTPATIENT
Start: 2018-09-19

## 2018-09-19 RX ORDER — OXYCODONE HCL 10 MG/1
10 TABLET, FILM COATED, EXTENDED RELEASE ORAL
Status: COMPLETED | OUTPATIENT
Start: 2018-09-19 | End: 2018-09-19

## 2018-09-19 RX ORDER — MEPERIDINE HYDROCHLORIDE 25 MG/ML
25 INJECTION INTRAMUSCULAR; INTRAVENOUS; SUBCUTANEOUS
Status: DISCONTINUED | OUTPATIENT
Start: 2018-09-19 | End: 2018-09-19 | Stop reason: HOSPADM

## 2018-09-19 RX ORDER — MIDAZOLAM HYDROCHLORIDE 1 MG/ML
0.5 INJECTION INTRAMUSCULAR; INTRAVENOUS
Status: DISCONTINUED | OUTPATIENT
Start: 2018-09-19 | End: 2018-09-19 | Stop reason: HOSPADM

## 2018-09-19 RX ORDER — ONDANSETRON 2 MG/ML
4 INJECTION INTRAMUSCULAR; INTRAVENOUS
Status: COMPLETED | OUTPATIENT
Start: 2018-09-19 | End: 2018-09-19

## 2018-09-19 RX ORDER — HYDRALAZINE HYDROCHLORIDE 20 MG/ML
5 INJECTION INTRAMUSCULAR; INTRAVENOUS
Status: DISCONTINUED | OUTPATIENT
Start: 2018-09-19 | End: 2018-09-19 | Stop reason: HOSPADM

## 2018-09-19 RX ORDER — DIPHENHYDRAMINE HYDROCHLORIDE 50 MG/ML
25 INJECTION INTRAMUSCULAR; INTRAVENOUS EVERY 6 HOURS PRN
Status: CANCELLED | OUTPATIENT
Start: 2018-09-19

## 2018-09-19 RX ORDER — ACETAMINOPHEN 500 MG
1000 TABLET ORAL ONCE
Status: COMPLETED | OUTPATIENT
Start: 2018-09-19 | End: 2018-09-19

## 2018-09-19 RX ORDER — LABETALOL HYDROCHLORIDE 5 MG/ML
5 INJECTION, SOLUTION INTRAVENOUS
Status: DISCONTINUED | OUTPATIENT
Start: 2018-09-19 | End: 2018-09-19 | Stop reason: HOSPADM

## 2018-09-19 RX ORDER — ONDANSETRON 2 MG/ML
4 INJECTION INTRAMUSCULAR; INTRAVENOUS EVERY 4 HOURS PRN
Status: CANCELLED | OUTPATIENT
Start: 2018-09-19

## 2018-09-19 RX ORDER — SCOLOPAMINE TRANSDERMAL SYSTEM 1 MG/1
1 PATCH, EXTENDED RELEASE TRANSDERMAL
Status: CANCELLED | OUTPATIENT
Start: 2018-09-19

## 2018-09-19 RX ORDER — BUPIVACAINE HYDROCHLORIDE AND EPINEPHRINE 2.5; 5 MG/ML; UG/ML
INJECTION, SOLUTION EPIDURAL; INFILTRATION; INTRACAUDAL; PERINEURAL
Status: DISCONTINUED | OUTPATIENT
Start: 2018-09-19 | End: 2018-09-19 | Stop reason: HOSPADM

## 2018-09-19 RX ORDER — OXYCODONE HCL 5 MG/5 ML
5 SOLUTION, ORAL ORAL
Status: DISCONTINUED | OUTPATIENT
Start: 2018-09-19 | End: 2018-09-19 | Stop reason: HOSPADM

## 2018-09-19 RX ORDER — SODIUM CHLORIDE, SODIUM LACTATE, POTASSIUM CHLORIDE, CALCIUM CHLORIDE 600; 310; 30; 20 MG/100ML; MG/100ML; MG/100ML; MG/100ML
INJECTION, SOLUTION INTRAVENOUS CONTINUOUS
Status: DISCONTINUED | OUTPATIENT
Start: 2018-09-19 | End: 2018-09-19 | Stop reason: HOSPADM

## 2018-09-19 RX ORDER — CELECOXIB 200 MG/1
400 CAPSULE ORAL ONCE
Status: COMPLETED | OUTPATIENT
Start: 2018-09-19 | End: 2018-09-19

## 2018-09-19 RX ORDER — OXYCODONE HCL 5 MG/5 ML
5 SOLUTION, ORAL ORAL
Status: COMPLETED | OUTPATIENT
Start: 2018-09-19 | End: 2018-09-19

## 2018-09-19 RX ORDER — ONDANSETRON 2 MG/ML
4 INJECTION INTRAMUSCULAR; INTRAVENOUS
Status: DISCONTINUED | OUTPATIENT
Start: 2018-09-19 | End: 2018-09-19 | Stop reason: HOSPADM

## 2018-09-19 RX ORDER — BUPIVACAINE HYDROCHLORIDE AND EPINEPHRINE 2.5; 5 MG/ML; UG/ML
INJECTION, SOLUTION INFILTRATION; PERINEURAL
Status: DISCONTINUED | OUTPATIENT
Start: 2018-09-19 | End: 2018-09-19 | Stop reason: HOSPADM

## 2018-09-19 RX ORDER — GABAPENTIN 300 MG/1
300 CAPSULE ORAL
Status: COMPLETED | OUTPATIENT
Start: 2018-09-19 | End: 2018-09-19

## 2018-09-19 RX ADMIN — ACETAMINOPHEN 1000 MG: 500 TABLET, FILM COATED ORAL at 12:38

## 2018-09-19 RX ADMIN — OXYCODONE HYDROCHLORIDE 5 MG: 5 SOLUTION ORAL at 15:40

## 2018-09-19 RX ADMIN — SODIUM CHLORIDE, SODIUM LACTATE, POTASSIUM CHLORIDE, CALCIUM CHLORIDE: 600; 310; 30; 20 INJECTION, SOLUTION INTRAVENOUS at 12:19

## 2018-09-19 RX ADMIN — OXYCODONE HYDROCHLORIDE 10 MG: 10 TABLET, FILM COATED, EXTENDED RELEASE ORAL at 12:39

## 2018-09-19 RX ADMIN — CELECOXIB 400 MG: 200 CAPSULE ORAL at 12:40

## 2018-09-19 RX ADMIN — GABAPENTIN 300 MG: 300 CAPSULE ORAL at 12:39

## 2018-09-19 RX ADMIN — FENTANYL CITRATE 25 MCG: 50 INJECTION, SOLUTION INTRAMUSCULAR; INTRAVENOUS at 15:18

## 2018-09-19 RX ADMIN — ONDANSETRON 4 MG: 2 INJECTION INTRAMUSCULAR; INTRAVENOUS at 17:04

## 2018-09-19 ASSESSMENT — PAIN SCALES - GENERAL
PAINLEVEL_OUTOF10: 4
PAINLEVEL_OUTOF10: 3
PAINLEVEL_OUTOF10: 4

## 2018-09-19 NOTE — OR SURGEON
Immediate Post OP Note    Pre-Operative Diagnosis: Recurrent pelvic floor relaxation, pelvic pain, mixed   urinary incontinence, strong type 2 stress urinary incontinence component as   demonstrated on urodynamic studies in addition to persistence dyspareunia.    Post-Operative Diagnosis: Same    Procedure(s):  ANTERIOR AND POSTERIOR REPAIR - Wound Class: Clean Contaminated  ENTEROCELE REPAIR- PERINEOPLASTY - Wound Class: Clean Contaminated  VAGINAL SUSPENSION- SACROSPINOUS VAULT - Wound Class: Clean Contaminated  BLADDER SLING FEMALE- TENSION FREE VAGINAL TAPE - Wound Class: Clean Contaminated    Surgeon(s):  FABIANA Sandoval M.D.    Anesthesiologist/Type of Anesthesia:  Anesthesiologist: Nando Guo M.D./General    Surgical Staff:  Circulator: Genoveva Banks R.N.  Relief Circulator: Ginger Villela R.N.  Relief Scrub: Marc Vargas    Specimens removed if any:  None    Estimated Blood Loss: Less than 30ccs    Findings: Good support of the anterior and the posterior and the apical vaginal tissue without any undue tension at any suture sites, cystoscopy revealed bilateral ureteral efflux, normal bladder and no undue tension at the mid urethra after sling placement.    Complications: None        9/19/2018 3:03 PM Jose C Villarreal M.D.

## 2018-09-19 NOTE — OR NURSING
1428-to pacu report from dr chen connected to all monitoring equipment. Pt is awake but sleepy. States pain is very minimal at this time without nausea. Warm blankets provided vss 95% on roomair resp even unlabored.  1515 -remains sleepy but when awake states she is having pain medicated per orders with fent. Vag bleeding scant     1540-medicated with oral pain meds per orders. Vag bleeding scant     1615- resting comfortable vss tolerating sips of water report to Genoveva for Rn break.    1700- pt states ready for dc to home preparing dc paperwork daughter now here and brought to bedside vag bleeding to dat pad scant     1715- pt states nausea and attempting to vomit. No emesis. Medicated per orders and given quese ease.   1730- intermittant nausea   1752- pt states ready to go home reviewed all dc instructions with pt and pt daughter assited to get dressed went over [facility] and pt is aware of need to return to dr soto office in the morning to have cath removed   Iv dcd and assisted pt into wheelchair and escorted out to car with all belongings

## 2018-09-19 NOTE — OP REPORT
DATE OF SERVICE:  09/19/2018    PREOPERATIVE DIAGNOSES:  Recurrent, symptomatic pelvic floor relaxation,   pelvic pain, mixed urinary incontinence, strong type 2 stress urinary   incontinence as noted on urodynamic studies in addition to persistent   dyspareunia.    POSTOPERATIVE DIAGNOSES:  Recurrent, symptomatic pelvic floor relaxation,   pelvic pain, mixed urinary incontinence, strong type 2 stress urinary   incontinence as noted on urodynamic studies in addition to persistent   dyspareunia.    PROCEDURES:  Anterior and posterior vaginal repair, enterocele repair,   perineoplasty, sacrospinous vault suspension, tension free vaginal tape, and   cystoscopy.    SURGEON:  Jose C Villarreal MD    ASSISTANT:  Daniel Rivera MD    ANESTHESIA:  General.    ANESTHESIOLOGIST:  Nando Guo MD    ESTIMATED BLOOD LOSS FOR THE PROCEDURE:  Less than 30 mL.    FINDINGS:  Good support of the anterior and posterior vaginal walls in   addition to apical vaginal tissue without any undue tension in the suture   sites.  Cystoscopy revealed normal urinary bladder, bilateral ureteral efflux,   and no undue tension noted at the level of the mid urethra after sling   placement on cystoscopic evaluation.    DESCRIPTION OF PROCEDURE:  The patient was taken to the operating room where   general anesthesia was performed without difficulty.  The patient was then   prepped and draped in the usual sterile fashion.  Lower extremities were   placed in Huy stirrups.  Attention was first turned to the perineum where a   weighted speculum was placed with the use of Ruff retractor.  An Allis clamp   was just placed lateral and superior to the vaginal cuff just lateral to the   midline on the anterior vaginal mucosa, the vaginal mucosa was injected with   10 mL of 0.25% Marcaine with epinephrine.  The vaginal mucosa was then   dissected off the underlying pubocervical fascia melvin and levator muscles were   then reached.  Horizontal mattress sutures  were then used to reapproximate   the pubocervical fascia in midline in a double layer closure, thereby reducing   the midline cystocele.  Attention was then turned to placement of 10 mL of   0.25% Marcaine with epinephrine in the retropubic space, dissection out   retropubically was then performed.  The Advantage Fit tension free vaginal   tape system was then loaded onto the sling passing a trocar device, which was   placed into the retropubic space taken out through the suprapubic skin in   which an incision was made.  This was performed on each side of the urethra at   the level of the mid urethra.  Once the sling passing the trocars had been   placed, attention was then turned to removal of the Patterson catheter, which has   been placed at the beginning of the case for placement of a 30-degree   cystoscope, the cystoscope was used to evaluate the bladder, which was found   to be intact and free of any obvious trauma, injury or mesh visualization.    The sling was then released.  Tensioning of position was then done under   direct cystoscopic evaluation.  Once the sling was resting against the mid   urethra without any undue tension and released, the cystoscope removed and   Patterson catheter replaced.  All excess vaginal mucosa and sling material were   then excised.  The vaginal mucosa was then reapproximated with 2-0 Vicryl in a   running interlocking fashion in one segment.  The posterior vaginal mucosa   was then injected with 10 mL of 0.25% Marcaine with epinephrine.  The vaginal   mucosa was then dissected off the underlying rectovaginal fascia melvin and   levator muscles were then reached.  Horizontal mattress sutures were then used   to reapproximate the rectovaginal fascia in the midline, thereby reducing a   large rectocele in a double layer closure.  Dissection of the ischial spine   was then performed on the right, 3 cm medial along the sacrospinous ligaments,   straight catheterization needle device, 0  Ethibond suture was taken through   the sacrospinous ligament and taken out through the right apical portion of   the vaginal cuff underlying vaginal mucosa.  Once tied down, there was good   reapproximation of the apex of the vaginal vault to the sacrospinous ligament.    The rectovaginal septum was then reapproximated to the posterior aspect of   the vaginal cuff in a double pursestring suture, thereby reducing a large   enterocele located at the superior aspect of the dissection.  All excess   vaginal mucosa was then trimmed.  The vaginal mucosa was then reapproximated   with 2-0 Vicryl running interlocking fashion in one segment for a   perineoplasty on the perineum.  The patient tolerated the procedure well and   she was awoken from general anesthesia, was taken to the recovery in stable   condition.       ____________________________________     MD NOEMI SCOTT / DENISE    DD:  09/19/2018 15:10:09  DT:  09/19/2018 15:32:26    D#:  [id number]  Job#:  [id number]

## 2018-09-19 NOTE — DISCHARGE INSTRUCTIONS
ACTIVITY: Rest and take it easy for the first 24 hours.  A responsible adult is recommended to remain with you during that time.  It is normal to feel sleepy.  We encourage you to not do anything that requires balance, judgment or coordination.    MILD FLU-LIKE SYMPTOMS ARE NORMAL. YOU MAY EXPERIENCE GENERALIZED MUSCLE ACHES, THROAT IRRITATION, HEADACHE AND/OR SOME NAUSEA.    FOR 24 HOURS DO NOT:  Drive, operate machinery or run household appliances.  Drink beer or alcoholic beverages.   Make important decisions or sign legal documents.  ***  SPECIAL INSTRUCTIONS: no heavy lifting anything greater than 10 pounds until after dr villarreal states its ok   Pelvic rest no tampons douche sexual intercourse until cleared by dr villarreal   No tub baths or swimming or hot tubs until after cleared by dr villarreal  Shower is ok tomorrow   Call md office the morning or just go into the office between 9-11am to have connelly catheter removed   Call with any excessive bleeding. Saturating more than one dat pad front to back every hour or for any amount that you dont feel comfortable with.     DIET: To avoid nausea, slowly advance diet as tolerated, avoiding spicy or greasy foods for the first day.  Add more substantial food to your diet according to your physician's instructions.  Babies can be fed formula or breast milk as soon as they are hungry.  INCREASE FLUIDS AND FIBER TO AVOID CONSTIPATION.    SURGICAL DRESSING/BATHING: *shower ok tomorrow no tub baths no swimming or hot tubs until ok per Dr Villarreal **    FOLLOW-UP APPOINTMENT:  A follow-up appointment should be arranged with your doctor in *morning **; call to schedule.    You should CALL YOUR PHYSICIAN if you develop:  Fever greater than 101 degrees F.  Pain not relieved by medication, or persistent nausea or vomiting.  Excessive bleeding (blood soaking through dressing) or unexpected drainage from the wound.  Extreme redness or swelling around the incision site, drainage of pus  or foul smelling drainage.  Inability to urinate or empty your bladder within 8 hours.  Problems with breathing or chest pain.    You should call 911 if you develop problems with breathing or chest pain.  If you are unable to contact your doctor or surgical center, you should go to the nearest emergency room or urgent care center.  Physician's telephone #: *[phone]**    If any questions arise, call your doctor.  If your doctor is not available, please feel free to call the Surgical Center at [phone].  The Center is open Monday through Friday from 7AM to 7PM.  You can also call the HEALTH HOTLINE open 24 hours/day, 7 days/week and speak to a nurse at [phone], or toll free at [phone].    A registered nurse may call you a few days after your surgery to see how you are doing after your procedure.    MEDICATIONS: Resume taking daily medication.  Take prescribed pain medication with food.  If no medication is prescribed, you may take non-aspirin pain medication if needed.  PAIN MEDICATION CAN BE VERY CONSTIPATING.  Take a stool softener or laxative such as senokot, pericolace, or milk of magnesia if needed.    Prescription given for *________ already has **.  Last pain medication given at *3:40pm**.    If your physician has prescribed pain medication that includes Acetaminophen (Tylenol), do not take additional Acetaminophen (Tylenol) while taking the prescribed medication.    Depression / Suicide Risk    As you are discharged from this [facility] facility, it is important to learn how to keep safe from harming yourself.    Recognize the warning signs:  · Abrupt changes in personality, positive or negative- including increase in energy   · Giving away possessions  · Change in eating patterns- significant weight changes-  positive or negative  · Change in sleeping patterns- unable to sleep or sleeping all the time   · Unwillingness or inability to communicate  · Depression  · Unusual sadness,  discouragement and loneliness  · Talk of wanting to die  · Neglect of personal appearance   · Rebelliousness- reckless behavior  · Withdrawal from people/activities they love  · Confusion- inability to concentrate     If you or a loved one observes any of these behaviors or has concerns about self-harm, here's what you can do:  · Talk about it- your feelings and reasons for harming yourself  · Remove any means that you might use to hurt yourself (examples: pills, rope, extension cords, firearm)  · Get professional help from the community (Mental Health, Substance Abuse, psychological counseling)  · Do not be alone:Call your Safe Contact- someone whom you trust who will be there for you.  · Call your local CRISIS HOTLINE 621-7248 or 321-236-4473  · Call your local Children's Mobile Crisis Response Team [place] (605) 010-0573 or www.NodePing  · Call the toll free National Suicide Prevention Hotlines   · National Suicide Prevention Lifeline 202-419-NBND (1898)  · National Hope Line Network 800-SUICIDE (480-3182)

## 2019-04-10 PROCEDURE — 99284 EMERGENCY DEPT VISIT MOD MDM: CPT

## 2019-04-11 ENCOUNTER — HOSPITAL ENCOUNTER (EMERGENCY)
Facility: MEDICAL CENTER | Age: 44
End: 2019-04-11
Attending: EMERGENCY MEDICINE
Payer: MEDICAID

## 2019-04-11 VITALS
RESPIRATION RATE: 16 BRPM | DIASTOLIC BLOOD PRESSURE: 86 MMHG | TEMPERATURE: 97.6 F | OXYGEN SATURATION: 97 % | BODY MASS INDEX: 27.55 KG/M2 | SYSTOLIC BLOOD PRESSURE: 128 MMHG | HEART RATE: 80 BPM | WEIGHT: 160.5 LBS

## 2019-04-11 DIAGNOSIS — J02.0 STREP PHARYNGITIS: ICD-10-CM

## 2019-04-11 LAB
APPEARANCE UR: ABNORMAL
BACTERIA #/AREA URNS HPF: ABNORMAL /HPF
BILIRUB UR QL STRIP.AUTO: NEGATIVE
COLOR UR: YELLOW
EPI CELLS #/AREA URNS HPF: ABNORMAL /HPF
FLUAV RNA SPEC QL NAA+PROBE: NEGATIVE
FLUBV RNA SPEC QL NAA+PROBE: NEGATIVE
GLUCOSE UR STRIP.AUTO-MCNC: 100 MG/DL
HYALINE CASTS #/AREA URNS LPF: ABNORMAL /LPF
KETONES UR STRIP.AUTO-MCNC: 40 MG/DL
LEUKOCYTE ESTERASE UR QL STRIP.AUTO: NEGATIVE
MICRO URNS: ABNORMAL
MUCOUS THREADS #/AREA URNS HPF: ABNORMAL /HPF
NITRITE UR QL STRIP.AUTO: POSITIVE
PH UR STRIP.AUTO: >=9 [PH]
RBC # URNS HPF: ABNORMAL /HPF
RBC UR QL AUTO: NEGATIVE
S PYO DNA SPEC NAA+PROBE: DETECTED
SP GR UR STRIP.AUTO: 1.02
UROBILINOGEN UR STRIP.AUTO-MCNC: 1 MG/DL
WBC #/AREA URNS HPF: ABNORMAL /HPF

## 2019-04-11 PROCEDURE — 87186 SC STD MICRODIL/AGAR DIL: CPT

## 2019-04-11 PROCEDURE — 81001 URINALYSIS AUTO W/SCOPE: CPT

## 2019-04-11 PROCEDURE — 87086 URINE CULTURE/COLONY COUNT: CPT

## 2019-04-11 PROCEDURE — 87651 STREP A DNA AMP PROBE: CPT

## 2019-04-11 PROCEDURE — 700102 HCHG RX REV CODE 250 W/ 637 OVERRIDE(OP): Performed by: EMERGENCY MEDICINE

## 2019-04-11 PROCEDURE — 87077 CULTURE AEROBIC IDENTIFY: CPT

## 2019-04-11 PROCEDURE — A9270 NON-COVERED ITEM OR SERVICE: HCPCS | Performed by: EMERGENCY MEDICINE

## 2019-04-11 PROCEDURE — 87502 INFLUENZA DNA AMP PROBE: CPT

## 2019-04-11 RX ORDER — AMOXICILLIN 500 MG/1
1000 CAPSULE ORAL DAILY
Qty: 20 CAP | Refills: 0 | Status: SHIPPED | OUTPATIENT
Start: 2019-04-11 | End: 2019-04-21

## 2019-04-11 RX ORDER — IBUPROFEN 600 MG/1
600 TABLET ORAL ONCE
Status: COMPLETED | OUTPATIENT
Start: 2019-04-11 | End: 2019-04-11

## 2019-04-11 RX ORDER — AMOXICILLIN 500 MG/1
1000 CAPSULE ORAL ONCE
Status: COMPLETED | OUTPATIENT
Start: 2019-04-11 | End: 2019-04-11

## 2019-04-11 RX ADMIN — IBUPROFEN 600 MG: 600 TABLET ORAL at 01:29

## 2019-04-11 RX ADMIN — AMOXICILLIN 1000 MG: 500 CAPSULE ORAL at 02:27

## 2019-04-11 ASSESSMENT — ENCOUNTER SYMPTOMS
VOMITING: 0
DIARRHEA: 0
FEVER: 1
COUGH: 0
SORE THROAT: 1
ABDOMINAL PAIN: 1
BACK PAIN: 0
CHILLS: 1

## 2019-04-11 NOTE — ED NOTES
Pt discharged home. Assessment complete. Pt ambulates self with steady gait. VS stable. Pt verbalized understanding discharge instructions. Prescriptions given; pt verbalizes teaching provided.

## 2019-04-11 NOTE — ED NOTES
Lab called with critical result of positive Strep at 0216. Critical lab result read back to lab personnel.   Dr. Oviedo notified of critical lab result at 0218.  Critical lab result read back by Dr. Oviedo.

## 2019-04-11 NOTE — LETTER
4/14/2019               Deanne Catalan  [address] [address]        Dear Deanne (MR#[id number])    This letter is sent in regards to your, recent visit to the [facility] Emergency Department on 4/10/2019.  During the visit, tests were performed to assist the physician in a medical diagnosis.  A review of those tests requires that we notify you of the following:    Your urine culture was POSITIVE for a bacteria called Escherichia coli. The antibiotic prescribed for you (amoxicillin) should be active to treat this bacteria. IT IS IMPORTANT THAT YOU CONTINUE TAKING YOUR ANTIBIOTIC UNTIL IT IS FINISHED.      Please feel free to contact me at the number below if you have any questions or concerns. Thank you for your cooperation in the matter.    Sincerely,  ED Culture Follow-Up Staff  Giorgio Otto, PharmD    [facility], Emergency Department  [address] [place] [id number]  [phone] (ED Culture Line)  [phone]

## 2019-04-11 NOTE — ED PROVIDER NOTES
ED Provider Note    Scribed for Hope Oviedo M.D. by Ron Mitchell. 4/11/2019, 1:13 AM.    Primary care provider: Winston Castle D.N.P.  Means of arrival: Walk-in  History obtained from: Patient  History limited by: None    CHIEF COMPLAINT  Chief Complaint   Patient presents with   • Ear Pain     bilat ear pain, +pressure, +fever at home       ATIYA Catalan is a 43 y.o. female who presents to the Emergency Department for evaluation of a fever onset today. Her highest measured temperature at home was 103 °F. The patient affirms additional symptoms of chills and sore throat. She also affirms mild bilateral ear pain onset one week ago. The patient took Tylenol three hours ago for her symptoms with little alleviation. The patient also affirms mild suprapubic abdominal pain for the past several days. The patient has an abdominal surgical history of a hysterectomy in November 2017. She had recent sick contact to her daughter who was sick with similar symptoms two weeks ago.   No vomiting, diarrhea, cough, dysuria, or back pain.    REVIEW OF SYSTEMS  Review of Systems   Constitutional: Positive for chills and fever.   HENT: Positive for ear pain and sore throat.    Respiratory: Negative for cough.    Gastrointestinal: Positive for abdominal pain. Negative for diarrhea and vomiting.   Genitourinary: Negative for dysuria.   Musculoskeletal: Negative for back pain.     All other systems negative.    PAST MEDICAL HISTORY   has a past medical history of Anemia (11/21/2017); Dental disorder (11/20/2017); Psychiatric problem; and Urinary incontinence.    SURGICAL HISTORY   has a past surgical history that includes gastric bypass laparoscopic; bladder sling female (11/28/2017); vaginal hysterectomy scope total (11/28/2017); salpingectomy (Bilateral, 11/28/2017); anterior and posterior repair (11/28/2017); enterocele repair (11/28/2017); vaginal suspension (11/28/2017); anterior and posterior repair (9/19/2018);  enterocele repair (9/19/2018); vaginal suspension (9/19/2018); and bladder sling female (9/19/2018).    SOCIAL HISTORY  Social History   Substance Use Topics   • Smoking status: Never Smoker   • Smokeless tobacco: Never Used   • Alcohol use No      History   Drug Use No       CURRENT MEDICATIONS  Reviewed.  See Encounter Summary.     ALLERGIES  No Known Allergies    PHYSICAL EXAM  VITAL SIGNS: /89   Pulse (!) 101   Temp 36.4 °C (97.6 °F) (Temporal)   Resp 18   Wt 72.8 kg (160 lb 7.9 oz)   LMP  (LMP Unknown) Comment: hcg dos  SpO2 95%   BMI 27.55 kg/m²   Constitutional: Alert in no apparent distress.  HENT: No signs of trauma, Nose normal. Posterior oropharynx with erythema and exudates on scant tonsillar tissue. TM's clear bilaterally without erythema or bulging.  Eyes: Pupils are equal and reactive, Conjunctiva normal, Non-icteric.   Neck: Normal range of motion, No tenderness, Supple, No stridor.   Lymphatic: cervical lymphadenopathy noted.   Cardiovascular: Regular rate and rhythm, no murmurs.   Thorax & Lungs: Normal breath sounds, No respiratory distress, No wheezing, No chest tenderness.   Abdomen: Soft, Suprapubic tenderness, No masses, No pulsatile masses. No peritoneal signs.  Skin: Warm, Dry, No erythema, No rash.   Back: No bony tenderness, No CVA tenderness.   Extremities: Intact distal pulses, No edema, No tenderness, No cyanosis  Musculoskeletal: Good range of motion in all major joints. No tenderness to palpation or major deformities noted.   Neurologic: Alert , Normal motor function, Normal sensory function, No focal deficits noted.   Psychiatric: Affect normal, Judgment normal, Mood normal.       DIAGNOSTIC STUDIES / PROCEDURES     LABS  Results for orders placed or performed during the hospital encounter of 04/11/19   URINALYSIS CULTURE, IF INDICATED   Result Value Ref Range    Color Yellow     Character Cloudy (A)     Specific Gravity 1.023 <1.035    Ph >=9.0 (A) 5.0 - 8.0    Glucose  100 (A) Negative mg/dL    Ketones 40 (A) Negative mg/dL    Bilirubin Negative Negative    Urobilinogen, Urine 1.0 Negative    Nitrite Positive (A) Negative    Leukocyte Esterase Negative Negative    Occult Blood Negative Negative    Micro Urine Req Microscopic    Influenza A/B By PCR (Adult - Flu Only)   Result Value Ref Range    Influenza virus A RNA Negative Negative    Influenza virus B, PCR Negative Negative   Group A Strep by PCR   Result Value Ref Range    Group A Strep by PCR DETECTED (A) Not Detected   URINE MICROSCOPIC (W/UA)   Result Value Ref Range    WBC 2-5 /hpf    RBC Rare /hpf    Bacteria Many (A) None /hpf    Epithelial Cells Moderate (A) /hpf    Mucous Threads Moderate /hpf    Hyaline Cast 0-2 /lpf       All labs were reviewed by me.      COURSE & MEDICAL DECISION MAKING  Pertinent Labs & Imaging studies reviewed. (See chart for details)    1:13 AM - Patient seen and examined at bedside. Patient will be treated with 600 mg Motrin. Ordered Urine microscopic, Group A strep, Urinalysis culture, and influenza A/B to evaluate her symptoms.     Decision Making:  This is a 43 y.o. year old female who presents with 2 days of fever, ear pain, and sore throat.  On my examination, the patient had a red posterior oropharynx and exudate present concerning for strep pharyngitis.  Differential diagnosis also includes but is not limited to influenza, otitis media, UTI, postoperative pain.    Testing for strep was positive.  Flu testing was negative.  Patient was given her first dose of amoxicillin in the emergency department for treatment of strep pharyngitis.  Patient's urinalysis was markedly dirty with positive nitrate and bacteria present.  There were no significant white blood cells present to suggest acute cystitis.  There were moderate epithelial cells, and feel that this is likely a dirty catch causing this appearance.  Elected to culture the urine and will call the patient if a pathogen grows.    The  patient will return for new or worsening symptoms and is stable at the time of discharge.    The patient is referred to a primary physician for blood pressure management, diabetic screening, and for all other preventative health concerns.      DISPOSITION:  Patient will be discharged home in stable condition.    FOLLOW UP:  TIM DuranP.  [address] [facility]  [address]  [place] [address]  [phone]            OUTPATIENT MEDICATIONS:  Discharge Medication List as of 4/11/2019  2:24 AM      START taking these medications    Details   amoxicillin (AMOXIL) 500 MG Cap Take 2 Caps by mouth every day for 10 days., Disp-20 Cap, R-0, Normal              FINAL IMPRESSION  1. Strep pharyngitis          Ron MAGAÑA (Scribe), am scribing for, and in the presence of, Hope Oviedo M.D..    Electronically signed by: Ron Mitchell (Scribe), 4/11/2019    Hope MAGAÑA M.D. personally performed the services described in this documentation, as scribed by Ron Mitchell in my presence, and it is both accurate and complete.    The note accurately reflects work and decisions made by me.  Hope Oviedo  4/11/2019  2:32 AM

## 2019-04-11 NOTE — ED TRIAGE NOTES
Deanne Hossein  43 y.o. female  Chief Complaint   Patient presents with   • Ear Pain     bilat ear pain, +pressure, +fever at home       Pt amb to triage with steady gait for above complaint.   Pt is alert and oriented, speaking in full sentences, follows commands and responds appropriately to questions. NAD. Resp are even and unlabored.  Pt placed in lobby. Pt educated on triage process. Pt encouraged to alert staff for any changes.  /89   Pulse (!) 101   Temp 36.4 °C (97.6 °F) (Temporal)   Resp 18   Wt 72.8 kg (160 lb 7.9 oz)   LMP  (LMP Unknown) Comment: hcg dos  SpO2 95%   BMI 27.55 kg/m²

## 2019-04-13 LAB
BACTERIA UR CULT: ABNORMAL
BACTERIA UR CULT: ABNORMAL
SIGNIFICANT IND 70042: ABNORMAL
SITE SITE: ABNORMAL
SOURCE SOURCE: ABNORMAL

## 2019-04-14 NOTE — PROGRESS NOTES
ED Positive Culture Follow-up/Notification Note:    Date: 4/14/19     Patient seen in the ED on 4/10/2019 for sore throat, fever, suprapubic pain.   1. Strep pharyngitis       Discharge Medication List as of 4/11/2019  2:24 AM      START taking these medications    Details   amoxicillin (AMOXIL) 500 MG Cap Take 2 Caps by mouth every day for 10 days., Disp-20 Cap, R-0, Normal             Allergies: Patient has no known allergies.     Vitals:    04/10/19 2312 04/10/19 2318 04/11/19 0130 04/11/19 0229   BP: 132/89   128/86   Pulse: (!) 101  82 80   Resp: 18  16 16   Temp: 36.4 °C (97.6 °F)      TempSrc: Temporal      SpO2: 95%  98% 97%   Weight:  72.8 kg (160 lb 7.9 oz)         Final cultures:   Results     Procedure Component Value Units Date/Time    URINE CULTURE [[id number]]  (Abnormal)  (Susceptibility) Collected:  04/11/19 0133    Order Status:  Completed Specimen:  Urine Updated:  04/13/19 0958     Significant Indicator POS (POS)     Source UR     Site -     Urine Culture Mixed skin jim 10-50,000 cfu/mL (A)      Escherichia coli  >100,000 cfu/mL   (A)    Narrative:       Indication for culture:->Emergency Room Patient    Culture & Susceptibility     ESCHERICHIA COLI     Antibiotic Sensitivity Microscan Unit Status    Ampicillin Sensitive <=8 mcg/mL Final    Method: PEDRO    Cefepime Sensitive <=8 mcg/mL Final    Method: PEDRO    Cefotaxime Sensitive <=2 mcg/mL Final    Method: PEDRO    Cefotetan Sensitive <=16 mcg/mL Final    Method: PEDRO    Ceftazidime Sensitive <=1 mcg/mL Final    Method: PEDRO    Ceftriaxone Sensitive <=8 mcg/mL Final    Method: PEDRO    Cefuroxime Sensitive <=4 mcg/mL Final    Method: PEDRO    Cephalothin Sensitive <=8 mcg/mL Final    Method: PEDRO    Ciprofloxacin Sensitive <=1 mcg/mL Final    Method: PEDRO    Gentamicin Sensitive <=4 mcg/mL Final    Method: PEDRO    Levofloxacin Sensitive <=2 mcg/mL Final    Method: PEDRO    Nitrofurantoin Sensitive <=32 mcg/mL Final    Method: PEDRO    Pip/Tazobactam Sensitive  <=16 mcg/mL Final    Method: PEDRO    Piperacillin Sensitive <=16 mcg/mL Final    Method: PEDRO    Tigecycline Sensitive <=2 mcg/mL Final    Method: PEDRO    Tobramycin Sensitive <=4 mcg/mL Final    Method: PEDRO    Trimeth/Sulfa Sensitive <=2/38 mcg/mL Final    Method: PEDRO                       URINALYSIS CULTURE, IF INDICATED [[id number]]  (Abnormal) Collected:  04/11/19 0133    Order Status:  Completed Specimen:  Urine Updated:  04/11/19 0219     Color Yellow     Character Cloudy (A)     Specific Gravity 1.023     Ph >=9.0 (A)     Glucose 100 (A) mg/dL      Ketones 40 (A) mg/dL      Bilirubin Negative     Urobilinogen, Urine 1.0     Nitrite Positive (A)     Leukocyte Esterase Negative     Occult Blood Negative     Micro Urine Req Microscopic    Group A Strep by PCR [[id number]]  (Abnormal) Collected:  04/11/19 0130    Order Status:  Completed Updated:  04/11/19 0217     Group A Strep by PCR DETECTED (A)    Influenza A/B By PCR (Adult - Flu Only) [[id number]] Collected:  04/11/19 0130    Order Status:  Completed Specimen:  Respirate from Nasopharyngeal Updated:  04/11/19 0215     Influenza virus A RNA Negative     Influenza virus B, PCR Negative    Group A Strep by PCR [[id number]] Collected:  04/11/19 0130    Order Status:  Canceled Updated:  04/11/19 0138    RAPID STREP, CULT IF INDICATED (CULTURE IF NEGATIVE) [[id number]] Collected:  04/11/19 0000    Order Status:  Canceled Specimen:  Other from Throat           Plan:   Appropriate antibiotic therapy prescribed. No changes required based upon culture result.  Sent letter to patient to notify of positive culture result and encourage compliance with prescribed antibiotics.     Giorgio Otto

## (undated) DEVICE — TRAY FOLEY CATHETER STATLOCK 16FR SURESTEP  (10EA/CA)

## (undated) DEVICE — CANISTER SUCTION 3000ML MECHANICAL FILTER AUTO SHUTOFF MEDI-VAC NONSTERILE LF DISP  (40EA/CA)

## (undated) DEVICE — ELECTRODE 850 FOAM ADHESIVE - HYDROGEL RADIOTRNSPRNT (50/PK)

## (undated) DEVICE — SET EXTENSION WITH 2 PORTS (48EA/CA) ***PART #2C8610 IS A SUBSTITUTE*****

## (undated) DEVICE — SUTURE 0 VICRYL PLUS CT-1 - 8 X 18 INCH (12/BX)

## (undated) DEVICE — SUTURE GENERAL

## (undated) DEVICE — SET IRRIGATION CYSTOSCOPY TUBE L80 IN (20EA/CA)

## (undated) DEVICE — GLOVE BIOGEL SZ 8 SURGICAL PF LTX - (50PR/BX 4BX/CA)

## (undated) DEVICE — DEVICE SUTURE CAPTURING

## (undated) DEVICE — TROCAR STEP 11MM - (3/CA)

## (undated) DEVICE — BLADE SURGICAL #11 - (50/BX)

## (undated) DEVICE — KIT  I.V. START (100EA/CA)

## (undated) DEVICE — SYRINGE 10 ML CONTROL LL (25EA/BX 4BX/CA)

## (undated) DEVICE — Device

## (undated) DEVICE — TUBING CLEARLINK DUO-VENT - C-FLO (48EA/CA)

## (undated) DEVICE — CATHETER IV 20 GA X 1-1/4 ---SURG.& SDS ONLY--- (50EA/BX)

## (undated) DEVICE — PAD SANITARY 11IN MAXI IND WRAPPED  (12EA/PK 24PK/CA)

## (undated) DEVICE — CANISTER SUCTION RIGID RED 1500CC (40EA/CA)

## (undated) DEVICE — TUBE CONNECTING SUCTION - CLEAR PLASTIC STERILE 72 IN (50EA/CA)

## (undated) DEVICE — TROCAR STEP 5MM - (3/CA)

## (undated) DEVICE — SENSOR SPO2 NEO LNCS ADHESIVE (20/BX) SEE USER NOTES

## (undated) DEVICE — SODIUM CHL IRRIGATION 0.9% 1000ML (12EA/CA)

## (undated) DEVICE — KIT ANESTHESIA W/CIRCUIT & 3/LT BAG W/FILTER (20EA/CA)

## (undated) DEVICE — DRAPE VAGINAL BIB W/ POUCH (10EA/CA)

## (undated) DEVICE — LACTATED RINGERS INJ 1000 ML - (14EA/CA 60CA/PF)

## (undated) DEVICE — NEEDLE INSUFFLATION FOR STEP - (12/BX)

## (undated) DEVICE — GLOVESZ 8.5 BIOGEL PI MICRO - PF LF (50PR/BX)

## (undated) DEVICE — PACK MINOR BASIN - (2EA/CA)

## (undated) DEVICE — SUCTION INSTRUMENT YANKAUER BULBOUS TIP W/O VENT (50EA/CA)

## (undated) DEVICE — TRAY SRGPRP PVP IOD WT PRP - (20/CA)

## (undated) DEVICE — NEPTUNE 4 PORT MANIFOLD - (20/PK)

## (undated) DEVICE — ELECTRODE DUAL RETURN W/ CORD - (50/PK)

## (undated) DEVICE — HEAD HOLDER JUNIOR/ADULT

## (undated) DEVICE — SET SUCTION/IRRIGATION WITH DISPOSABLE TIP (6/CA )PART #0250-070-520 IS A SUB

## (undated) DEVICE — PROTECTOR ULNA NERVE - (36PR/CA)

## (undated) DEVICE — PACK LAPAROSCOPY - (1/CA)

## (undated) DEVICE — BLADE SURGICAL #15 - (50/BX 3BX/CA)

## (undated) DEVICE — SUTURE 2-0 VICRYL PLUS CT-1 36 (36PK/BX)"

## (undated) DEVICE — WATER IRRIGATION STERILE 1000ML (12EA/CA)

## (undated) DEVICE — MASK ANESTHESIA ADULT  - (100/CA)

## (undated) DEVICE — GLOVE BIOGEL INDICATOR SZ 7.5 SURGICAL PF LTX - (50PR/BX 4BX/CA)

## (undated) DEVICE — SUTURE 2-0 VICRYL PLUS CT-2 - 27 INCH (36/BX)

## (undated) DEVICE — SET LEADWIRE 5 LEAD BEDSIDE DISPOSABLE ECG (1SET OF 5/EA)

## (undated) DEVICE — LIGASURE LAPAROSCOPIC 5MM - (6EA/CA)

## (undated) DEVICE — CLOSURE SKIN STRIP 1/2 X 4 IN - (STERI STRIP) (50/BX 4BX/CA)

## (undated) DEVICE — ARMREST CRADLE FOAM - (2PR/PK 12PR/CA)

## (undated) DEVICE — BANDAID X-LARGE 2 X 4 IN LF (50EA/BX)

## (undated) DEVICE — TUBING SETDISPOS HIGH FLOW II - (10/BX)

## (undated) DEVICE — BANDAID SHEER STRIP 3/4 IN (100EA/BX 12BX/CA)

## (undated) DEVICE — TUBE E-T HI-LO CUFF 7.0MM (10EA/PK)

## (undated) DEVICE — CHLORAPREP 26 ML APPLICATOR - ORANGE TINT(25/CA)

## (undated) DEVICE — SUTURE 4-0 VICRYL PLUS FS-2 - 27 INCH (36/BX)